# Patient Record
Sex: MALE | Race: WHITE | Employment: FULL TIME | URBAN - METROPOLITAN AREA
[De-identification: names, ages, dates, MRNs, and addresses within clinical notes are randomized per-mention and may not be internally consistent; named-entity substitution may affect disease eponyms.]

---

## 2018-03-10 ENCOUNTER — HOSPITAL ENCOUNTER (EMERGENCY)
Age: 63
Discharge: HOME | End: 2018-03-10
Payer: COMMERCIAL

## 2018-03-10 VITALS
BODY MASS INDEX: 26.63 KG/M2 | HEART RATE: 71 BPM | DIASTOLIC BLOOD PRESSURE: 73 MMHG | WEIGHT: 165 LBS | SYSTOLIC BLOOD PRESSURE: 146 MMHG | TEMPERATURE: 97.9 F | OXYGEN SATURATION: 97 % | RESPIRATION RATE: 20 BRPM

## 2018-03-10 DIAGNOSIS — J01.10 ACUTE FRONTAL SINUSITIS, RECURRENCE NOT SPECIFIED: Primary | ICD-10-CM

## 2018-03-10 PROCEDURE — 99212 OFFICE O/P EST SF 10 MIN: CPT

## 2018-03-10 RX ORDER — AMOXICILLIN AND CLAVULANATE POTASSIUM 875; 125 MG/1; MG/1
1 TABLET, FILM COATED ORAL 2 TIMES DAILY
Qty: 20 TABLET | Refills: 0 | Status: SHIPPED | OUTPATIENT
Start: 2018-03-10 | End: 2018-03-20

## 2018-03-10 RX ORDER — BROMPHENIRAMINE MALEATE, PSEUDOEPHEDRINE HYDROCHLORIDE, AND DEXTROMETHORPHAN HYDROBROMIDE 2; 30; 10 MG/5ML; MG/5ML; MG/5ML
5 SYRUP ORAL 4 TIMES DAILY PRN
Qty: 140 ML | Refills: 0 | Status: SHIPPED | OUTPATIENT
Start: 2018-03-10 | End: 2018-03-17

## 2018-03-10 NOTE — ED PROVIDER NOTES
present. No uvular deviation or edema. No tonsillary asymmetry.  Floor of the mouth soft, no trismus, handling secretions. TMs bilaterally demonstrate no evidence of infection. He has tenderness over the maxillary sinuses. Neck: Normal range of motion is achieved in the neck. There is no JVD present. No meningeal signs are present   Anterior cervical adenopathy is negative. Respiratory/chest: The chest is nontender. Breath sounds are normal. There is no respiratory distress.   Cardiovascular: Heart shows a regular rate and rhythm without murmurs clicks or gallops. Abdominal exam: The abdomen is non tender without evidence of peritoneal signs. SSkin: warm and dry, without rash  Neurologic: GCS 15   Psych: Normal Affect  -------------------------------------------------- RESULTS -------------------------------------------------    LABS:  No results found for this visit on 03/10/18. RADIOLOGY:  Interpreted by Radiologist.  No orders to display           ------------------------------ ED COURSE/MEDICAL DECISION MAKING----------------------  Medications - No data to display    ED COURSE:  ED Course          Medical Decision Making:         Upper respiratory infection with sinusitis. Not hypoxic, nothing to suggests pneumonia. Well appearing, non toxic, appropriate for outpatient management. Plan is for symptom management and PCP follow up. He was started on Augmentin and also Bromfed cough syrup he was advised to follow-up with his doctor if he does not improve         This patient's ED course included: a personal history and physicial eaxmination and re-evaluation prior to disposition    This patient has remained hemodynamically stable during their ED course. Counseling: The emergency provider has spoken with the patient and discussed todays results, in addition to providing specific details for the plan of care and counseling regarding the diagnosis and prognosis.   Questions are answered at this time

## 2018-08-29 ENCOUNTER — HOSPITAL ENCOUNTER (EMERGENCY)
Age: 63
Discharge: HOME | End: 2018-08-29
Payer: COMMERCIAL

## 2018-08-29 ENCOUNTER — APPOINTMENT (OUTPATIENT)
Dept: GENERAL RADIOLOGY | Age: 63
End: 2018-08-29
Payer: COMMERCIAL

## 2018-08-29 VITALS
OXYGEN SATURATION: 96 % | BODY MASS INDEX: 26.95 KG/M2 | TEMPERATURE: 98.5 F | RESPIRATION RATE: 16 BRPM | SYSTOLIC BLOOD PRESSURE: 134 MMHG | WEIGHT: 167 LBS | DIASTOLIC BLOOD PRESSURE: 49 MMHG | HEART RATE: 70 BPM

## 2018-08-29 DIAGNOSIS — S33.5XXA LUMBAR SPRAIN, INITIAL ENCOUNTER: Primary | ICD-10-CM

## 2018-08-29 PROCEDURE — 6360000002 HC RX W HCPCS: Performed by: PHYSICIAN ASSISTANT

## 2018-08-29 PROCEDURE — 99212 OFFICE O/P EST SF 10 MIN: CPT

## 2018-08-29 PROCEDURE — 72100 X-RAY EXAM L-S SPINE 2/3 VWS: CPT

## 2018-08-29 RX ORDER — KETOROLAC TROMETHAMINE 30 MG/ML
30 INJECTION, SOLUTION INTRAMUSCULAR; INTRAVENOUS ONCE
Status: COMPLETED | OUTPATIENT
Start: 2018-08-29 | End: 2018-08-29

## 2018-08-29 RX ADMIN — KETOROLAC TROMETHAMINE 30 MG: 30 INJECTION, SOLUTION INTRAMUSCULAR at 16:12

## 2018-08-29 ASSESSMENT — PAIN DESCRIPTION - PAIN TYPE: TYPE: ACUTE PAIN

## 2018-08-29 ASSESSMENT — PAIN SCALES - GENERAL
PAINLEVEL_OUTOF10: 3
PAINLEVEL_OUTOF10: 9

## 2018-08-29 ASSESSMENT — PAIN DESCRIPTION - LOCATION: LOCATION: BACK

## 2018-08-29 ASSESSMENT — PAIN DESCRIPTION - DESCRIPTORS: DESCRIPTORS: ACHING

## 2018-08-29 NOTE — ED PROVIDER NOTES
deficit or sensory deficit. Coordination and gait normal. GCS eye subscore is 4. GCS verbal subscore is 5. GCS motor subscore is 6. Skin: Skin is warm and dry. No abrasion and no rash noted. Nursing note and vitals reviewed. Procedures    [facility]       --------------------------------------------- PAST HISTORY ---------------------------------------------  Past Medical History:  has no past medical history on file. Past Surgical History:  has a past surgical history that includes brain surgery (1994). Social History:  reports that he has never smoked. He has never used smokeless tobacco. He reports that he does not drink alcohol or use drugs. Family History: Family history is unknown by patient. The patients home medications have been reviewed. Allergies: Patient has no known allergies. -------------------------------------------------- RESULTS -------------------------------------------------  No results found for this visit on 08/29/18. XR LUMBAR SPINE (2-3 VIEWS)   Final Result   1. No acute lumbar spine fracture. 2. Mild lower lumbar facet arthrosis.          ------------------------- NURSING NOTES AND VITALS REVIEWED ---------------------------   The nursing notes within the ED encounter and vital signs as below have been reviewed. BP (!) 134/49   Pulse 70   Temp 98.5 °F (36.9 °C) (Oral)   Resp 16   Wt 167 lb (75.8 kg)   SpO2 96%   BMI 26.95 kg/m²   Oxygen Saturation Interpretation: Normal      ------------------------------------------ PROGRESS NOTES ------------------------------------------   I have spoken with the patient and discussed todays results, in addition to providing specific details for the plan of care and counseling regarding the diagnosis and prognosis.   Their questions are answered at this time and they are agreeable with the plan.      --------------------------------- ADDITIONAL PROVIDER NOTES ---------------------------------      This patient is stable

## 2019-02-06 ENCOUNTER — APPOINTMENT (OUTPATIENT)
Dept: GENERAL RADIOLOGY | Age: 64
End: 2019-02-06
Payer: COMMERCIAL

## 2019-02-06 ENCOUNTER — HOSPITAL ENCOUNTER (EMERGENCY)
Age: 64
Discharge: HOME | End: 2019-02-06
Payer: COMMERCIAL

## 2019-02-06 VITALS
OXYGEN SATURATION: 97 % | SYSTOLIC BLOOD PRESSURE: 134 MMHG | DIASTOLIC BLOOD PRESSURE: 79 MMHG | HEART RATE: 73 BPM | WEIGHT: 167 LBS | BODY MASS INDEX: 26.95 KG/M2 | TEMPERATURE: 98 F | RESPIRATION RATE: 16 BRPM

## 2019-02-06 DIAGNOSIS — J06.9 ACUTE UPPER RESPIRATORY INFECTION: Primary | ICD-10-CM

## 2019-02-06 PROCEDURE — 99212 OFFICE O/P EST SF 10 MIN: CPT

## 2019-02-06 PROCEDURE — 71046 X-RAY EXAM CHEST 2 VIEWS: CPT

## 2019-02-06 RX ORDER — BROMPHENIRAMINE MALEATE, PSEUDOEPHEDRINE HYDROCHLORIDE, AND DEXTROMETHORPHAN HYDROBROMIDE 2; 30; 10 MG/5ML; MG/5ML; MG/5ML
10 SYRUP ORAL 4 TIMES DAILY PRN
Qty: 140 ML | Refills: 0 | Status: SHIPPED | OUTPATIENT
Start: 2019-02-06 | End: 2019-02-13

## 2019-02-06 RX ORDER — AZITHROMYCIN 250 MG/1
TABLET, FILM COATED ORAL
Qty: 6 TABLET | Refills: 0 | Status: SHIPPED | OUTPATIENT
Start: 2019-02-06 | End: 2019-02-16

## 2019-04-30 ENCOUNTER — HOSPITAL ENCOUNTER (EMERGENCY)
Age: 64
Discharge: HOME | End: 2019-04-30
Payer: COMMERCIAL

## 2019-04-30 VITALS
HEIGHT: 67 IN | HEART RATE: 72 BPM | BODY MASS INDEX: 26.21 KG/M2 | RESPIRATION RATE: 20 BRPM | OXYGEN SATURATION: 97 % | WEIGHT: 167 LBS | SYSTOLIC BLOOD PRESSURE: 157 MMHG | DIASTOLIC BLOOD PRESSURE: 52 MMHG | TEMPERATURE: 97.9 F

## 2019-04-30 DIAGNOSIS — J40 BRONCHITIS: Primary | ICD-10-CM

## 2019-04-30 DIAGNOSIS — J06.9 UPPER RESPIRATORY TRACT INFECTION, UNSPECIFIED TYPE: ICD-10-CM

## 2019-04-30 PROCEDURE — 99212 OFFICE O/P EST SF 10 MIN: CPT

## 2019-04-30 RX ORDER — AZITHROMYCIN 250 MG/1
TABLET, FILM COATED ORAL
Qty: 6 TABLET | Refills: 0 | Status: SHIPPED | OUTPATIENT
Start: 2019-04-30 | End: 2019-05-10

## 2019-04-30 RX ORDER — BROMPHENIRAMINE MALEATE, PSEUDOEPHEDRINE HYDROCHLORIDE, AND DEXTROMETHORPHAN HYDROBROMIDE 2; 30; 10 MG/5ML; MG/5ML; MG/5ML
10 SYRUP ORAL 4 TIMES DAILY PRN
Qty: 140 ML | Refills: 0 | Status: SHIPPED | OUTPATIENT
Start: 2019-04-30 | End: 2019-05-07

## 2019-04-30 NOTE — ED PROVIDER NOTES
Department of Emergency . [facility] [address] [facility]  Provider Note  Admit Date/RoomTime: 4/30/2019  4:14 PM  Room: 02/02    Chief Complaint   Nasal Congestion; Cough (Worse at night.); and Chest Congestion    History of Present Illness   Source of history provided by:  patient. History/Exam Limitations: none. Tomás Kline is a 70 y.o. old male who has a past medical history of: History reviewed. No pertinent past medical history. presents to the urgent care center  by private vehicle, with complaints of cough, chest congestion nasal congestion postnasal drip and the cough seems to be worse at night. He does not have a fever does not have body aches does not have chest pain or shortness of breath. He said he is been sick for 2 weeks and it's been persisting. ROS   Pertinent positives and negatives are stated within HPI, all other systems reviewed and are negative. Past Surgical History:   Procedure Laterality Date    BRAIN SURGERY  1994    [facility]   Social History:  reports that he has never smoked. He has never used smokeless tobacco. He reports that he does not drink alcohol or use drugs. Family History: Family history is unknown by patient. Allergies: Patient has no known allergies. Physical Exam           ED Triage Vitals [04/30/19 1615]   BP Temp Temp Source Pulse Resp SpO2 Height Weight   (!) 157/52 97.9 °F (36.6 °C) Oral 72 20 97 % 5' 7\" (1.702 m) 167 lb (75.8 kg)      Oxygen Saturation Interpretation: Normal.    Constitutional:  Alert, development consistent with age. HEENT:  NC/NT. Airway patent. Bilateral TMs normal with no signs of infection, pharynx without exudates no tonsillar enlargement. Neck:  Normal ROM. Supple. Respiratory:  Breath sounds: equal bilaterally. Lung sounds: normal.   CV:  Regular rate and rhythm, normal heart sounds, without pathological murmurs, ectopy, gallops, or rubs. .  GI:  Abdomen Soft, nontender, good bowel sounds.   No firm or pulsatile mass. Integument:  Normal turgor. Warm, dry, without visible rash. Lymphatic:  Edema:  none Bilateral lower extremity(s). Neurological:  Oriented. Motor functions intact. Lab / Imaging Results   (All laboratory and radiology results have been personally reviewed by myself)  Labs:  No results found for this visit on 04/30/19. Imaging: All Radiology results interpreted by Radiologist unless otherwise noted. No orders to display       ED Course / Medical Decision Making   Medications - No data to display     Consult(s):   None  MDM:      Patient  with an upper respiratory infection. Chest congestion his lungs are clear his sats 97% he does not have a fever. I did put him on a Z-Elen and Bromfed advised if he worsens or does not improve he should get reevaluated    Patient is well appearing, non toxic and appropriate for outpatient management. Plan of Care: Normal progression of disease discussed. All questions answered. Explained the rationale for symptomatic treatment  Instruction provided in the use of fluids, vaporizer, acetaminophen, and other OTC medication for symptom control. Extra fluids  Analgesics as needed, dose reviewed. Follow up as needed should symptoms fail to improve. Counseling:   I have spoken with the patient and discussed todays results, in addition to providing specific details for the plan of care and counseling regarding the diagnosis and prognosis. Questions are answered at this time and they are agreeable with the plan. Assessment      1. Bronchitis    2. Upper respiratory tract infection, unspecified type      Plan   Discharge to home and advised to contact follow up with your Dr      As needed   Patient condition is good    New Medications     New Prescriptions    AZITHROMYCIN (ZITHROMAX Z-ELEN) 250 MG TABLET    Take 2 tabs on day one, followed by 1 tablet daily for 4 days.     BROMPHENIRAMINE-PSEUDOEPHEDRINE-DM 2-30-10 MG/5ML SYRUP    Take 10 mLs by mouth 4 times daily as needed for Congestion or Cough     Electronically signed by OXANA Tripp CNP   DD: 4/30/19  **This report was transcribed using voice recognition software. Every effort was made to ensure accuracy; however, inadvertent computerized transcription errors may be present.   END OF ED PROVIDER NOTE     OXANA Tripp CNP  04/30/19 7922

## 2020-03-26 ENCOUNTER — HOSPITAL ENCOUNTER (EMERGENCY)
Age: 65
Discharge: HOME | End: 2020-03-26
Payer: COMMERCIAL

## 2020-03-26 VITALS
RESPIRATION RATE: 16 BRPM | OXYGEN SATURATION: 97 % | DIASTOLIC BLOOD PRESSURE: 74 MMHG | HEART RATE: 87 BPM | TEMPERATURE: 98.2 F | BODY MASS INDEX: 26.16 KG/M2 | SYSTOLIC BLOOD PRESSURE: 179 MMHG | WEIGHT: 167 LBS

## 2020-03-26 PROCEDURE — 99212 OFFICE O/P EST SF 10 MIN: CPT

## 2020-03-26 RX ORDER — AZITHROMYCIN 250 MG/1
TABLET, FILM COATED ORAL
Qty: 1 PACKET | Refills: 0 | Status: SHIPPED | OUTPATIENT
Start: 2020-03-26 | End: 2020-04-05

## 2020-03-26 RX ORDER — AZITHROMYCIN 250 MG/1
TABLET, FILM COATED ORAL
Qty: 1 PACKET | Refills: 0 | Status: SHIPPED | OUTPATIENT
Start: 2020-03-26 | End: 2020-03-26 | Stop reason: SDUPTHER

## 2020-03-26 NOTE — ED PROVIDER NOTES
Abdomen is not rigid. Tenderness: There is no abdominal tenderness. There is no guarding or rebound. Skin:     General: Skin is warm and dry. Findings: No abrasion or rash. Neurological:      Mental Status: He is alert and oriented to person, place, and time. GCS: GCS eye subscore is 4. GCS verbal subscore is 5. GCS motor subscore is 6. Cranial Nerves: No cranial nerve deficit. Sensory: No sensory deficit. Coordination: Coordination normal.      Gait: Gait normal.         Procedures    MDM  Number of Diagnoses or Management Options  Acute bronchitis, unspecified organism:   Diagnosis management comments: He will keep using his vaporizer at home. He does have some cough and cold medication he can take. I will add Zithromax to this.           --------------------------------------------- PAST HISTORY ---------------------------------------------  Past Medical History:  has no past medical history on file. Past Surgical History:  has a past surgical history that includes brain surgery (1994). Social History:  reports that he has never smoked. He has never used smokeless tobacco. He reports that he does not drink alcohol or use drugs. Family History: Family history is unknown by patient. The patients home medications have been reviewed. Allergies: Patient has no known allergies. -------------------------------------------------- RESULTS -------------------------------------------------  No results found for this visit on 03/26/20. No orders to display       ------------------------- NURSING NOTES AND VITALS REVIEWED ---------------------------   The nursing notes within the ED encounter and vital signs as below have been reviewed.    BP (!) 179/74   Pulse 87   Temp 98.2 °F (36.8 °C) (Oral)   Resp 16   Wt 167 lb (75.8 kg)   SpO2 97%   BMI 26.16 kg/m²   Oxygen Saturation Interpretation: Normal      ------------------------------------------ PROGRESS NOTES

## 2020-04-13 ENCOUNTER — HOSPITAL ENCOUNTER (EMERGENCY)
Age: 65
Discharge: HOME | End: 2020-04-13
Attending: NURSE PRACTITIONER
Payer: COMMERCIAL

## 2020-04-13 VITALS
HEART RATE: 78 BPM | TEMPERATURE: 97.9 F | DIASTOLIC BLOOD PRESSURE: 75 MMHG | BODY MASS INDEX: 25.84 KG/M2 | OXYGEN SATURATION: 98 % | WEIGHT: 165 LBS | RESPIRATION RATE: 18 BRPM | SYSTOLIC BLOOD PRESSURE: 144 MMHG

## 2020-04-13 PROCEDURE — 99212 OFFICE O/P EST SF 10 MIN: CPT

## 2020-04-13 RX ORDER — FLUTICASONE PROPIONATE 50 MCG
1 SPRAY, SUSPENSION (ML) NASAL DAILY
Qty: 1 BOTTLE | Refills: 0 | Status: SHIPPED | OUTPATIENT
Start: 2020-04-13 | End: 2020-12-16 | Stop reason: ALTCHOICE

## 2020-04-13 NOTE — ED PROVIDER NOTES
Regular rate and rhythm, normal heart sounds, without pathological murmurs, ectopy, gallops, or rubs. · Integument:  Normal turgor. Warm, dry, without visible rash. · Neurological:  Oriented. Motor functions intact. Lab / Imaging Results   (All laboratory and radiology results have been personally reviewed by myself)  Labs:  No results found for this visit on 04/13/20. Imaging: All Radiology results interpreted by Radiologist unless otherwise noted. No orders to display       ED Course / Medical Decision Making   Medications - No data to display       Consults:   None    Procedures:   none    Medical Decision Making:    Based on low suspicion for pneumonia as per history/physical findings, imaging was not done. Antibiotics are not indicated at this time based on clinical presentation and physical findings. He is not hypoxic. Patient is well appearing, non toxic and appropriate for outpatient management. Plan of Care: Normal progression of disease discussed. All questions answered. Explained the rationale for symptomatic treatment rather than use of an antibiotic. Extra fluids  Analgesics as needed, dose reviewed. Follow up as needed should symptoms fail to improve. Counseling: The emergency provider has spoken with the patient and discussed todays results, in addition to providing specific details for the plan of care and counseling regarding the diagnosis and prognosis. Questions are answered at this time and they are agreeable with the plan. Assessment     1. Allergic rhinitis, unspecified seasonality, unspecified trigger      Plan   Discharge to home  Patient condition is stable    New Medications     New Prescriptions    FLUTICASONE (FLONASE) 50 MCG/ACT NASAL SPRAY    1 spray by Each Nostril route daily     Electronically signed by OXANA Swanson CNP   DD: 4/13/20  **This report was transcribed using voice recognition software.  Every effort was made to ensure accuracy;

## 2020-04-14 ENCOUNTER — CARE COORDINATION (OUTPATIENT)
Dept: CARE COORDINATION | Age: 65
End: 2020-04-14

## 2020-04-14 NOTE — CARE COORDINATION
sick, wash your hands before and after you interact with pets and wear a facemask. Call ahead before visiting your doctor  If you have a medical appointment, call the healthcare provider and tell them that you have or may have COVID-19. This will help the healthcare provider's office take steps to keep other people from getting infected or exposed. Wear a facemask  You should wear a facemask when you are around other people (e.g., sharing a room or vehicle) or pets and before you enter a healthcare provider's office. If you are not able to wear a facemask (for example, because it causes trouble breathing), then people who live with you should not stay in the same room with you, or they should wear a facemask if they enter your room. Cover your coughs and sneezes  Cover your mouth and nose with a tissue when you cough or sneeze. Throw used tissues in a lined trash can. Immediately wash your hands with soap and water for at least 20 seconds or, if soap and water are not available, clean your hands with an alcohol-based hand  that contains at least 60% alcohol. Clean your hands often  Wash your hands often with soap and water for at least 20 seconds, especially after blowing your nose, coughing, or sneezing; going to the bathroom; and before eating or preparing food. If soap and water are not readily available, use an alcohol-based hand  with at least 60% alcohol, covering all surfaces of your hands and rubbing them together until they feel dry. Soap and water are the best option if hands are visibly dirty. Avoid touching your eyes, nose, and mouth with unwashed hands. Avoid sharing personal household items  You should not share dishes, drinking glasses, cups, eating utensils, towels, or bedding with other people or pets in your home. After using these items, they should be washed thoroughly with soap and water.   Clean all high-touch surfaces everyday  High touch surfaces include counters, tabletops, doorknobs, bathroom fixtures, toilets, phones, keyboards, tablets, and bedside tables. Also, clean any surfaces that may have blood, stool, or body fluids on them. Use a household cleaning spray or wipe, according to the label instructions. Labels contain instructions for safe and effective use of the cleaning product including precautions you should take when applying the product, such as wearing gloves and making sure you have good ventilation during use of the product. Monitor your symptoms  Seek prompt medical attention if your illness is worsening (e.g., difficulty breathing). Before seeking care, call your healthcare provider and tell them that you have, or are being evaluated for, COVID-19. Put on a facemask before you enter the facility. These steps will help the healthcare provider's office to keep other people in the office or waiting room from getting infected or exposed. Ask your healthcare provider to call the local or [facility] health department. Persons who are placed under If you have a medical emergency and need to call 911, notify the dispatch personnel that you have, or are being evaluated for COVID-19. If possible, put on a facemask before emergency medical services arrive. The patient agrees to contact the [facility] exposure line [phone], local health department [place] [facility]: ([phone]) and PCP office for questions related to their healthcare. Author provided contact information for future reference. Patient/family/caregiver given information for [employer] and agrees to enroll no    Based on Loop alert triggers, patient will be contacted by nurse care manager for worsening symptoms.

## 2020-04-28 ENCOUNTER — CARE COORDINATION (OUTPATIENT)
Dept: CARE COORDINATION | Age: 65
End: 2020-04-28

## 2020-09-27 ENCOUNTER — HOSPITAL ENCOUNTER (EMERGENCY)
Age: 65
Discharge: HOME | End: 2020-09-27
Payer: COMMERCIAL

## 2020-09-27 VITALS
RESPIRATION RATE: 16 BRPM | WEIGHT: 165 LBS | SYSTOLIC BLOOD PRESSURE: 146 MMHG | TEMPERATURE: 98.8 F | HEART RATE: 67 BPM | OXYGEN SATURATION: 98 % | DIASTOLIC BLOOD PRESSURE: 72 MMHG | BODY MASS INDEX: 25.84 KG/M2

## 2020-09-27 PROCEDURE — 99212 OFFICE O/P EST SF 10 MIN: CPT

## 2020-09-27 NOTE — ED PROVIDER NOTES
Department of Emergency Medicine   ED  Provider Note  Admit Date/RoomTime: 9/27/2020  3:28 PM  ED Room: 02/02   Chief Complaint:   Otalgia (bilateral ears started Wednesday)    History of Present Illness   Source of history provided by:  patient. History/Exam Limitations: none. Grayland Cogan is a 77 y.o. old male with a past medical history of: History reviewed. No pertinent past medical history. ,presenting to the emergency department with complaint of ear discomfort and itching. Patient states he has had skin cancer in the past so now he spray sunblock on him all the time. He is concerned that he sprayed sunblock into his ears. He states they have been itchy. He states he is also getting flakes from the ears and is concerned he has a lot of wax. Patient denies fevers and chills. Does admit to dizziness if he stands up suddenly from bending over. Denies any dizziness or lightheadedness just sitting or just lying. Denies sore throat or difficulty swallowing. Denies any nausea or vomiting. ROS    Pertinent positives and negatives are stated within HPI, all other systems reviewed and are negative. Past Surgical History:  has a past surgical history that includes brain surgery (1994). Social History:  reports that he has never smoked. He has never used smokeless tobacco. He reports that he does not drink alcohol or use drugs. Family History: Family history is unknown by patient. Allergies: Patient has no known allergies. Physical Exam           ED Triage Vitals   BP Temp Temp src Pulse Resp SpO2 Height Weight   09/27/20 1532 09/27/20 1530 -- 09/27/20 1530 09/27/20 1530 09/27/20 1530 -- 09/27/20 1530   (!) 146/72 98.8 °F (37.1 °C)  67 16 98 %  165 lb (74.8 kg)      Oxygen Saturation Interpretation: Normal.    General:  NAD. Alert and Oriented. Well-appearing. Skin:  Warm, dry. No rashes. Head:  Normocephalic. Atraumatic. Eyes:  EOMI.   Conjunctiva normal.  ENT:  Oral mucosa moist. Airway patent. Posterior pharynx without erythema, without swelling, without exudate. Uvula is long, however not edematous or erythematous. Bilateral TMs without erythema, without bulging. Bilateral external ear canals without erythema, without edema, without discharge. He has minimal wax to both ears. No lesions noted to the canals. Ears are nontender to palpation or pulling. Negative mastoid tenderness bilateral.  Neck:  Supple. Normal ROM. Respiratory:  No respiratory distress. No labored breathing. Lungs clear without rales, rhonchi or wheezing. Cardiovascular:  Regular rate. No Murmur. No peripheral edema. Extremities warm and good color. Extremities:  Normal ROM. Nontender to palpation. Atraumatic. Back:  Normal ROM. Nontender to palpation. Neuro:  Alert and Oriented to person, place, time and situation. Normal LOC. Moves all extremities. Speech fluent. Psych:  Calm and Cooperative. Normal thought process. Normal judgement. Lab / Imaging Results   (All laboratory and radiology results have been personally reviewed by myself)  Labs:  No results found for this visit on 09/27/20. Imaging: All Radiology results interpreted by Radiologist unless otherwise noted. No orders to display     ED Course / Medical Decision Making   Medications - No data to display     Re-examination:  9/27/20       Time:        Consult(s):   None    Procedure(s):   none    MDM:       Counseling: The emergency provider has spoken with the patient and discussed todays results, in addition to providing specific details for the plan of care and counseling regarding the diagnosis and prognosis. Questions are answered at this time and they are agreeable with the plan. Assessment      1.  Otalgia of both ears      Plan   Discharge to home  Patient condition is good    New Medications     New Prescriptions    No medications on file     Electronically signed by CHITO Rivas   DD: 9/27/20  **This

## 2020-12-26 PROBLEM — J34.2 ACQUIRED DEVIATED NASAL SEPTUM: Status: ACTIVE | Noted: 2020-12-26

## 2021-12-22 ENCOUNTER — HOSPITAL ENCOUNTER (EMERGENCY)
Age: 66
Discharge: HOME | End: 2021-12-22
Payer: COMMERCIAL

## 2021-12-22 VITALS
TEMPERATURE: 97.7 F | DIASTOLIC BLOOD PRESSURE: 85 MMHG | OXYGEN SATURATION: 95 % | WEIGHT: 165 LBS | SYSTOLIC BLOOD PRESSURE: 143 MMHG | BODY MASS INDEX: 25.84 KG/M2 | HEART RATE: 100 BPM | RESPIRATION RATE: 18 BRPM

## 2021-12-22 DIAGNOSIS — J01.90 ACUTE SINUSITIS, RECURRENCE NOT SPECIFIED, UNSPECIFIED LOCATION: Primary | ICD-10-CM

## 2021-12-22 PROCEDURE — 99211 OFF/OP EST MAY X REQ PHY/QHP: CPT

## 2021-12-22 RX ORDER — AZITHROMYCIN 250 MG/1
TABLET, FILM COATED ORAL
Qty: 6 TABLET | Refills: 0 | Status: SHIPPED | OUTPATIENT
Start: 2021-12-22 | End: 2022-01-01

## 2021-12-22 NOTE — ED PROVIDER NOTES
Department of Emergency . [facility] [address] Urgent [facility]  Provider Note  Admit Date/RoomTime: 2021  9:35 AM  Room:   NAME: Gali Mak  : 1955  MRN: [id number]     Chief Complaint:  Sinusitis (drainage, pressure, congestion, started yesterday)    History of Present Illness       Gali Mak is a 74 y.o. old male who presents to the emergency department sinus congestion and drainage. He said it started yesterday. He said he does not believe it is Covid has not been exposed does not have a fever body aches sore throat or shortness of breath. He said he has a lot of pressure over his forehead he said he gets this usually once a year he has a history of a deviated septum. Eyes chest pain shortness of breath body aches loss of taste or smell or any other complaints    ROS    Pertinent positives and negatives are stated within HPI, all other systems reviewed and are negative. Past Surgical History:   Procedure Laterality Date    BRAIN SURGERY      [facility]   Social History:  reports that he has never smoked. He has never used smokeless tobacco. He reports that he does not drink alcohol and does not use drugs. Family History: Family history is unknown by patient. Allergies: Patient has no known allergies. Physical Exam            ED Triage Vitals [21 0940]   BP Temp Temp src Pulse Resp SpO2 Height Weight   (!) 143/85 100.2 °F (37.9 °C) -- 100 18 95 % -- 165 lb (74.8 kg)      Oxygen Saturation Interpretation: Normal.    Constitutional:  Alert, development consistent with age. Ears:  External Ears: Bilateral normal.               TM's & External Canals: normal TMs bilaterally. Nose:   There is no discharge, swelling or lesions noted. Sinuses: no Bilateral maxillary sinus tenderness. mild Bilateral frontal sinus tenderness. Mouth:  normal tongue and buccal mucosa. Throat: no erythema or exudates noted.  Teeth and gums normal..  Airway Patent. Neck/Lymphatics:  Neck Supple. Respiratory:     Lung sounds: normal.   CV:  Regular rate and rhythm  GI:  Abdomen Soft, nontender, good bowel sounds. No firm or pulsatile mass. Integument:  .  Warm, dry, without visible rash. Neurological:  Oriented. Motor functions intact. Lab / Imaging Results   (All laboratory and radiology results have been personally reviewed by myself)  Labs:  No results found for this visit on 12/22/21. Imaging: All Radiology results interpreted by Radiologist unless otherwise noted. No orders to display     ED Course / Medical Decision Making   Medications - No data to display       MDM:   Patient is requesting a Z-Elen he said that works for his sinus symptoms he has no other complaints does not want tested for Covid does not just has a sinus symptoms no other symptoms. 1. Acute sinusitis, recurrence not specified, unspecified location      Plan   Discharge to home and advised to contact follow up with your Dr      As needed   Patient condition is good    New Medications     New Prescriptions    AZITHROMYCIN (ZITHROMAX Z-ELEN) 250 MG TABLET    Take 2 tabs on day one, followed by 1 tablet daily for 4 days. Electronically signed by OXANA Harris CNP   DD: 12/22/21  **This report was transcribed using voice recognition software. Every effort was made to ensure accuracy; however, inadvertent computerized transcription errors may be present.   END OF ED PROVIDER NOTE     OXANA Harris CNP  12/22/21 1010

## 2021-12-29 ENCOUNTER — APPOINTMENT (OUTPATIENT)
Dept: GENERAL RADIOLOGY | Age: 66
End: 2021-12-29
Payer: COMMERCIAL

## 2021-12-29 ENCOUNTER — HOSPITAL ENCOUNTER (EMERGENCY)
Age: 66
Discharge: HOME | End: 2021-12-29
Payer: COMMERCIAL

## 2021-12-29 VITALS
TEMPERATURE: 100.7 F | OXYGEN SATURATION: 94 % | SYSTOLIC BLOOD PRESSURE: 130 MMHG | BODY MASS INDEX: 26.16 KG/M2 | DIASTOLIC BLOOD PRESSURE: 68 MMHG | WEIGHT: 167 LBS | HEART RATE: 94 BPM | RESPIRATION RATE: 20 BRPM

## 2021-12-29 DIAGNOSIS — U07.1 COVID-19: Primary | ICD-10-CM

## 2021-12-29 LAB — SARS-COV-2, NAAT: DETECTED

## 2021-12-29 PROCEDURE — 87635 SARS-COV-2 COVID-19 AMP PRB: CPT

## 2021-12-29 PROCEDURE — 99211 OFF/OP EST MAY X REQ PHY/QHP: CPT

## 2021-12-29 PROCEDURE — 71046 X-RAY EXAM CHEST 2 VIEWS: CPT

## 2021-12-29 NOTE — ED PROVIDER NOTES
Department of Emergency [address] [facility]  Provider Note  Admit Date/Time: 2021 11:00 AM  Room:   NAME: Ryan Montana  : 1955  MRN: [id number]     Chief Complaint:  Cough (nause, body aches, sick for 2 weeks, had a ZPAC  from us a week and a half ago, still sick)    History of Present Illness        Ryan Montana is a 74 y.o. male who has a past medical history of:   Past Medical History:   Diagnosis Date    Seizures ([address]     had surgery for them, last seizure     presents to the urgent care center by private car for evaluation. He has a cough, body aches, fever, chest congestion, nasal congestion. Last week he had a Z-Mark for his sinuses and now he is worse and in fact no better. He is not vaccinated for Covid. ROS    Pertinent positives and negatives are stated within HPI, all other systems reviewed and are negative. Past Surgical History:   Procedure Laterality Date    BRAIN SURGERY      AVM   Social History:  reports that he has never smoked. He has never used smokeless tobacco. He reports that he does not drink alcohol and does not use drugs. Family History: Family history is unknown by patient. Allergies: Patient has no known allergies. Physical Exam   Oxygen Saturation Interpretation: Normal.   ED Triage Vitals   BP Temp Temp src Pulse Resp SpO2 Height Weight   21 1109 21 1102 -- 21 1102 21 1102 21 1102 -- 21 1102   130/68 100.7 °F (38.2 °C)  94 20 94 %  167 lb (75.8 kg)       Physical Exam  · Constitutional/General: Alert and oriented x3, ill appearing, non toxic in NAD  · HEENT:  NC/NT.clear conjunctiva. · Neck: Supple, full ROM,   · Respiratory: Lungs clear to auscultation bilaterally,  · CV:  Regular rate. Regular rhythm. · Musculoskeletal: Moves all extremities x 4.   · Integument: skin warm and dry. No rashes.    · Lymphatic: no lymphadenopathy noted  · Neurologic: GCS 15, no focal deficits,   · Psychiatric: Normal Affect    Lab / Imaging Results   (All laboratory and radiology results have been personally reviewed by myself)  Labs:  Results for orders placed or performed during the hospital encounter of 12/29/21   COVID-19, Rapid    Specimen: Nasopharyngeal Swab   Result Value Ref Range    SARS-CoV-2, NAAT DETECTED (A) Not Detected     Imaging: All Radiology results interpreted by Radiologist unless otherwise noted. XR CHEST (2 VW)   Final Result   No pneumonia or pleural effusion. ED Course / Medical Decision Making   Medications - No data to display       Consult(s):   None    MDM:   Patient here with a fever of 100.7 he has been coughing he is achy all over. He is not been vaccinated. He was seen here last week for his sinuses did not want Covid tested at that point. I did test him today with a rapid test and he is positive. Did a chest x-ray and it was negative. He had a Z-Mark last week for his sinuses which did not help    I advised him to obtain a pulse oximeter to monitor his oxygen level if it is over 93 I told him that was normal I told him if it is 90 or below he needs to go to the emergency department he is 94% today    I advised him to monitor his temperature keep his fever controlled with Tylenol or ibuprofen he can also take over-the-counter cough medicine as needed. He should stay in quarantine, and hip he has any worsening symptoms at all such as weakness chest pain or shortness of breath go to the emergency department        Assessment      1.  COVID-19      Plan   Discharge to home and advised to contact     Schedule an appointment as soon as possible for a visit in 3 days  Follow up within 3 days, Return to ED sooner if symptoms worsen    Call the referral line to get established with a doctor  [phone]       Patient condition is good    New Medications     New Prescriptions    No medications on file     Electronically signed by OXANA Wheeler - CNP   DD: 12/29/21  **This report was transcribed using voice recognition software. Every effort was made to ensure accuracy; however, inadvertent computerized transcription errors may be present.   END OF ED PROVIDER NOTE     OXANA Marie - CNP  12/29/21 1142

## 2021-12-30 ENCOUNTER — CARE COORDINATION (OUTPATIENT)
Dept: CARE COORDINATION | Age: 66
End: 2021-12-30

## 2021-12-30 NOTE — CARE COORDINATION
Date/Time:  12/30/2021 11:32 AM  Attempted to reach patient by telephone. Call within 2 business days of discharge: Yes Left HIPPA compliant message requesting a return call. Will attempt to reach patient again.

## 2022-12-30 ENCOUNTER — ANESTHESIA EVENT (OUTPATIENT)
Dept: OPERATING ROOM | Age: 67
End: 2022-12-30
Payer: COMMERCIAL

## 2022-12-30 NOTE — ANESTHESIA PRE PROCEDURE
Department of Anesthesiology  Preprocedure Note       Name:  Ryan Montana   Age:  69 y.o.  :  1955                                          MRN:  [id number]         Date:  2022      Surgeon: Maude Thomas):  Scott Bolaños MD    Procedure: Procedure(s):  CATARACT EXTRACTION WITH IOL    Medications prior to admission:   Prior to Admission medications    Medication Sig Start Date End Date Taking? Authorizing Provider   Multiple Vitamins-Minerals (MULTI COMPLETE PO) Take by mouth   Yes Historical Provider, MD       Current medications:    No current facility-administered medications for this encounter. Current Outpatient Medications   Medication Sig Dispense Refill    Multiple Vitamins-Minerals (MULTI COMPLETE PO) Take by mouth         Allergies:  No Known Allergies    Problem List:    Patient Active Problem List   Diagnosis Code    Near syncope R51    Acquired deviated nasal septum J34.2       Past Medical History:        Diagnosis Date    Seiz[address] ([facility] [address]     had surgery for them, last seizure        Past Surgical History:        Procedure Laterality Date    BRAIN SURGERY      AVM    COLONOSCOPY      ENDOSCOPY, COLON, DIAGNOSTIC         Social History:    Social History     Tobacco Use    Smoking status: Never    Smokeless tobacco: Never   Substance Use Topics    Alcohol use: No                                Counseling given: Not Answered      Vital Signs (Current):   Vitals:    22 1023   Weight: 167 lb (75.8 kg)   Height: 5' 7\" (1.702 m)                                              BP Readings from Last 3 Encounters:   21 130/68   21 (!) 143/85   21 138/88       NPO Status:                           >8 hours                                                      BMI:   Wt Readings from Last 3 Encounters:   21 167 lb (75.8 kg)   21 165 lb (74.8 kg)   21 170 lb 6.4 oz (77.3 kg)     Body mass index is 26.16 kg/m².     CBC:   Lab Results   Component Value Date/Time    WBC 8.1 05/21/2013 04:25 PM    RBC 5.12 05/21/2013 04:25 PM    HGB 14.9 05/21/2013 04:25 PM    HCT 44.4 05/21/2013 04:25 PM    MCV 86.7 05/21/2013 04:25 PM    RDW 13.9 05/21/2013 04:25 PM     05/21/2013 04:25 PM       CMP:   Lab Results   Component Value Date/Time     05/22/2013 02:05 AM    K 3.8 05/22/2013 02:05 AM     05/22/2013 02:05 AM    CO2 27 05/22/2013 02:05 AM    BUN 18 05/22/2013 02:05 AM    CREATININE 1.0 05/22/2013 02:05 AM    LABGLOM >60 05/22/2013 02:59 AM    GLUCOSE 118 05/22/2013 02:05 AM    GLUCOSE 108 11/26/2011 01:20 PM    PROT 6.4 05/22/2013 02:05 AM    CALCIUM 8.7 05/22/2013 02:05 AM    BILITOT 0.3 05/22/2013 02:05 AM    ALKPHOS 105 05/22/2013 02:05 AM    AST 20 05/22/2013 02:05 AM    ALT 30 05/22/2013 02:05 AM       POC Tests: No results for input(s): POCGLU, POCNA, POCK, POCCL, POCBUN, POCHEMO, POCHCT in the last 72 hours.     Coags: No results found for: PROTIME, INR, APTT    HCG (If Applicable): No results found for: PREGTESTUR, PREGSERUM, HCG, HCGQUANT     ABGs: No results found for: PHART, PO2ART, PFL8MGC, XPP4OQE, BEART, R4GWFZLK     Type & Screen (If Applicable):  No results found for: LABABO, LABRH    Drug/Infectious Status (If Applicable):  No results found for: HIV, HEPCAB    COVID-19 Screening (If Applicable):   Lab Results   Component Value Date/Time    COVID19 DETECTED 12/29/2021 11:20 AM           Anesthesia Evaluation  Patient summary reviewed and Nursing notes reviewed  Airway: Mallampati: II  TM distance: >3 FB   Neck ROM: full  Mouth opening: > = 3 FB   Dental: normal exam         Pulmonary: breath sounds clear to auscultation      (-) not a current smoker                           Cardiovascular:Negative CV ROS            Rhythm: regular             Beta Blocker:  Not on Beta Blocker         Neuro/Psych:   (+) seizures: well controlled,              ROS comment: cranio for AVM    Hx of near-syncope GI/Hepatic/Renal: Neg GI/Hepatic/Renal ROS            Endo/Other: Negative Endo/Other ROS                    Abdominal:             Vascular: negative vascular ROS. ROS comment: AVM---repaired. Other Findings:           Anesthesia Plan      MAC     ASA 2       Induction: intravenous. MIPS: Prophylactic antiemetics administered. Anesthetic plan and risks discussed with patient. Plan discussed with CRNA. Johana Fernandez MD   12/30/2022      DOS STAFF ADDENDUM:    Pt seen and examined, physical exam updated, chart reviewed including anesthesia, drug and allergy history. H&P reviewed. No interval changes to history or physical examination (unless noted above). NPO status confirmed. Anesthetic plan, risks, benefits, alternatives discussed with patient. Patient verbalized an understanding and agrees to proceed.      Leo Oropeza DO  Staff Anesthesiologist  6:59 AM

## 2023-01-02 NOTE — H&P
History and Physical    Patient's Name/Date of Birth: Cj Rodriguez / 1955 (11 y.o.)    Date: January 2, 2023     Chief Complaint: Decreased vision of the right eye    HPI: Mature right cataract with decreased vision . Risks and complications as well as options and benefits were discussed with the patient and he has elected to proceed with right cataract extraction with intra ocular lens implant. Past Medical History:   Diagnosis Date    Seizures ([address]     had surgery for them, last seizure 2008       Past Surgical History:   Procedure Laterality Date    BRAIN SURGERY  1994    AVM    COLONOSCOPY      ENDOSCOPY, COLON, DIAGNOSTIC         Prior to Admission medications    Medication Sig Start Date End Date Taking? Authorizing Provider   Multiple Vitamins-Minerals (MULTI COMPLETE PO) Take by mouth   Yes Historical Provider, MD       Patient has no known allergies.     Family History   Family history unknown: Yes       Social History     Socioeconomic History    Marital status:      Spouse name: Not on file    Number of children: Not on file    Years of education: Not on file    Highest education level: Not on file   Occupational History    Not on file   Tobacco Use    Smoking status: Never    Smokeless tobacco: Never   Vaping Use    Vaping Use: Never used   Substance and Sexual Activity    Alcohol use: No    Drug use: No    Sexual activity: Yes     Partners: Female   Other Topics Concern    Not on file   Social History Narrative    Not on file     Social Determinants of Health     Financial Resource Strain: Not on file   Food Insecurity: Not on file   Transportation Needs: Not on file   Physical Activity: Not on file   Stress: Not on file   Social Connections: Not on file   Intimate Partner Violence: Not on file   Housing Stability: Not on file       Review of Systems:   CONSTITUTIONAL:  Oriented to person, place and time  EYES:  Mature right cataract with decreased vision effecting reading, driving and all routine home activities . Visual acuity is 20/80  HEENT:  negative  RESPIRATORY:  negative  CARDIOVASCULAR:  negative  GASTROINTESTINAL:  negative  GENITOURINARY:  negative  INTEGUMENT/BREAST:  negative  HEMATOLOGIC/LYMPHATIC:  negative  ALLERGIC/IMMUNOLOGIC:  negative  ENDOCRINE:  negative  MUSCULOSKELETAL:  negative  NEUROLOGICAL:  negative  BEHAVIOR/PSYCH:  negative    Physical Exam:  Vitals:    12/28/22 1023   Weight: 167 lb (75.8 kg)   Height: 5' 7\" (1.702 m)       CONSTITUTIONAL:  awake, alert, cooperative, no apparent distress, and appears stated age  EYES:  Lids and lashes normal, pupils equal, round and reactive to light, extra ocular muscles intact, sclera clear, conjunctiva normal  ENT:  Normocephalic, without obvious abnormality, atraumatic, sinuses nontender on palpation, external ears without lesions, oral pharynx with moist mucus membranes, tonsils without erythema or exudates, gums normal and good dentition.   NECK:  Supple, symmetrical, trachea midline, no adenopathy, thyroid symmetric, not enlarged and no tenderness, skin normal  HEMATOLOGIC/LYMPHATICS:  no cervical lymphadenopathy and no supraclavicular lymphadenopathy  BACK:  Symmetric, no curvature, spinous processes are non-tender on palpation, paraspinous muscles are non-tender on palpation, no costal vertebral tenderness  LUNGS:  No increased work of breathing, good air exchange, clear to auscultation bilaterally, no crackles or wheezing  CARDIOVASCULAR:  Normal apical impulse, regular rate and rhythm, normal S1 and S2, no S3 or S4, and no murmur noted  ABDOMEN:  No scars, normal bowel sounds, soft, non-distended, non-tender, no masses palpated, no hepatosplenomegally  CHEST/BREASTS:  Breasts symmetrical, skin without lesion(s), no nipple retraction or dimpling, no nipple discharge, no masses palpated, no axillary or supraclavicular adenopathy  GENITAL/URINARY:  Deferred  MUSCULOSKELETAL:  There is no redness, warmth, or swelling of the joints. Full range of motion noted. Motor strength is 5 out of 5 all extremities bilaterally. Tone is normal.  NEUROLOGIC:  Awake, alert, oriented to name, place and time. Cranial nerves II-XII are grossly intact. Motor is 5 out of 5 bilaterally. Cerebellar finger to nose, heel to shin intact. Sensory is intact. Babinski down going, Romberg negative, and gait is normal.  SKIN:  no bruising or bleeding, normal skin color, texture, turgor, and no redness, warmth, or swelling    Assessment:  Active Problems:    * No active hospital problems. *  Resolved Problems:    * No resolved hospital problems.  *      Plan:  Right cataract extraction with intra ocular lens implant    Electronically signed by Mónica Alas MD on 1/2/23 at 11:13 AM EST

## 2023-01-03 ENCOUNTER — ANESTHESIA (OUTPATIENT)
Dept: OPERATING ROOM | Age: 68
End: 2023-01-03
Payer: COMMERCIAL

## 2023-01-03 ENCOUNTER — HOSPITAL ENCOUNTER (OUTPATIENT)
Age: 68
Setting detail: OUTPATIENT SURGERY
Discharge: HOME | End: 2023-01-03
Attending: OPHTHALMOLOGY | Admitting: OPHTHALMOLOGY
Payer: COMMERCIAL

## 2023-01-03 VITALS
RESPIRATION RATE: 16 BRPM | HEIGHT: 67 IN | WEIGHT: 167 LBS | DIASTOLIC BLOOD PRESSURE: 60 MMHG | BODY MASS INDEX: 26.21 KG/M2 | SYSTOLIC BLOOD PRESSURE: 136 MMHG | OXYGEN SATURATION: 96 % | HEART RATE: 71 BPM

## 2023-01-03 PROBLEM — H26.9 RIGHT CATARACT: Status: ACTIVE | Noted: 2023-01-03

## 2023-01-03 PROCEDURE — 2500000003 HC RX 250 WO HCPCS: Performed by: NURSE ANESTHETIST, CERTIFIED REGISTERED

## 2023-01-03 PROCEDURE — 3600000003 HC SURGERY LEVEL 3 BASE: Performed by: OPHTHALMOLOGY

## 2023-01-03 PROCEDURE — 2580000003 HC RX 258: Performed by: ANESTHESIOLOGY

## 2023-01-03 PROCEDURE — 3700000000 HC ANESTHESIA ATTENDED CARE: Performed by: OPHTHALMOLOGY

## 2023-01-03 PROCEDURE — 7100000010 HC PHASE II RECOVERY - FIRST 15 MIN: Performed by: OPHTHALMOLOGY

## 2023-01-03 PROCEDURE — 2709999900 HC NON-CHARGEABLE SUPPLY: Performed by: OPHTHALMOLOGY

## 2023-01-03 PROCEDURE — 6360000002 HC RX W HCPCS: Performed by: NURSE ANESTHETIST, CERTIFIED REGISTERED

## 2023-01-03 PROCEDURE — 6370000000 HC RX 637 (ALT 250 FOR IP): Performed by: OPHTHALMOLOGY

## 2023-01-03 PROCEDURE — V2632 POST CHMBR INTRAOCULAR LENS: HCPCS | Performed by: OPHTHALMOLOGY

## 2023-01-03 PROCEDURE — 7100000011 HC PHASE II RECOVERY - ADDTL 15 MIN: Performed by: OPHTHALMOLOGY

## 2023-01-03 PROCEDURE — 2500000003 HC RX 250 WO HCPCS: Performed by: OPHTHALMOLOGY

## 2023-01-03 DEVICE — LENS INTOCU +21.5 DIOPT A CONSTANT 118.8 L13MM DIA6MM 0DEG: Type: IMPLANTABLE DEVICE | Site: EYE | Status: FUNCTIONAL

## 2023-01-03 RX ORDER — TETRACAINE HYDROCHLORIDE 5 MG/ML
SOLUTION OPHTHALMIC PRN
Status: DISCONTINUED | OUTPATIENT
Start: 2023-01-03 | End: 2023-01-03 | Stop reason: HOSPADM

## 2023-01-03 RX ORDER — FLURBIPROFEN SODIUM 0.3 MG/ML
1 SOLUTION/ DROPS OPHTHALMIC
Status: DISPENSED | OUTPATIENT
Start: 2023-01-03 | End: 2023-01-03

## 2023-01-03 RX ORDER — BALANCED SALT SOLUTION 6.4; .75; .48; .3; 3.9; 1.7 MG/ML; MG/ML; MG/ML; MG/ML; MG/ML; MG/ML
SOLUTION OPHTHALMIC PRN
Status: DISCONTINUED | OUTPATIENT
Start: 2023-01-03 | End: 2023-01-03 | Stop reason: HOSPADM

## 2023-01-03 RX ORDER — SODIUM CHLORIDE, SODIUM LACTATE, POTASSIUM CHLORIDE, CALCIUM CHLORIDE 600; 310; 30; 20 MG/100ML; MG/100ML; MG/100ML; MG/100ML
INJECTION, SOLUTION INTRAVENOUS CONTINUOUS
Status: DISCONTINUED | OUTPATIENT
Start: 2023-01-03 | End: 2023-01-03 | Stop reason: HOSPADM

## 2023-01-03 RX ORDER — TETRACAINE HYDROCHLORIDE 5 MG/ML
1 SOLUTION OPHTHALMIC ONCE
Status: COMPLETED | OUTPATIENT
Start: 2023-01-03 | End: 2023-01-03

## 2023-01-03 RX ORDER — CYCLOPENTOLATE HYDROCHLORIDE 10 MG/ML
1 SOLUTION/ DROPS OPHTHALMIC
Status: DISPENSED | OUTPATIENT
Start: 2023-01-03 | End: 2023-01-03

## 2023-01-03 RX ORDER — PHENYLEPHRINE HYDROCHLORIDE 100 MG/ML
1 SOLUTION/ DROPS OPHTHALMIC PRN
Status: DISCONTINUED | OUTPATIENT
Start: 2023-01-03 | End: 2023-01-03 | Stop reason: HOSPADM

## 2023-01-03 RX ORDER — MIDAZOLAM HYDROCHLORIDE 1 MG/ML
INJECTION INTRAMUSCULAR; INTRAVENOUS PRN
Status: DISCONTINUED | OUTPATIENT
Start: 2023-01-03 | End: 2023-01-03 | Stop reason: SDUPTHER

## 2023-01-03 RX ORDER — ALFENTANIL HYDROCHLORIDE 500 UG/ML
INJECTION INTRAVENOUS PRN
Status: DISCONTINUED | OUTPATIENT
Start: 2023-01-03 | End: 2023-01-03 | Stop reason: SDUPTHER

## 2023-01-03 RX ORDER — PROPARACAINE HYDROCHLORIDE 5 MG/ML
1 SOLUTION/ DROPS OPHTHALMIC
Status: DISPENSED | OUTPATIENT
Start: 2023-01-03 | End: 2023-01-03

## 2023-01-03 RX ORDER — PHENYLEPHRINE HCL 2.5 %
1 DROPS OPHTHALMIC (EYE)
Status: DISPENSED | OUTPATIENT
Start: 2023-01-03 | End: 2023-01-03

## 2023-01-03 RX ADMIN — CYCLOPENTOLATE HYDROCHLORIDE 1 DROP: 10 SOLUTION/ DROPS OPHTHALMIC at 06:45

## 2023-01-03 RX ADMIN — TETRACAINE HYDROCHLORIDE 1 DROP: 25 LIQUID OPHTHALMIC at 07:08

## 2023-01-03 RX ADMIN — PHENYLEPHRINE HYDROCHLORIDE 1 DROP: 25 SOLUTION/ DROPS OPHTHALMIC at 06:45

## 2023-01-03 RX ADMIN — ALFENTANIL HYDROCHLORIDE 250 MCG: 500 INJECTION INTRAVENOUS at 08:04

## 2023-01-03 RX ADMIN — FLURBIPROFEN SODIUM 1 DROP: 0.3 SOLUTION/ DROPS OPHTHALMIC at 06:45

## 2023-01-03 RX ADMIN — ALFENTANIL HYDROCHLORIDE 250 MCG: 500 INJECTION INTRAVENOUS at 07:59

## 2023-01-03 RX ADMIN — ALFENTANIL HYDROCHLORIDE 250 MCG: 500 INJECTION INTRAVENOUS at 07:57

## 2023-01-03 RX ADMIN — MIDAZOLAM 2 MG: 1 INJECTION INTRAMUSCULAR; INTRAVENOUS at 07:57

## 2023-01-03 RX ADMIN — PROPARACAINE HYDROCHLORIDE 1 DROP: 5 SOLUTION/ DROPS OPHTHALMIC at 06:45

## 2023-01-03 RX ADMIN — PHENYLEPHRINE HYDROCHLORIDE 1 DROP: 100 SOLUTION/ DROPS OPHTHALMIC at 07:08

## 2023-01-03 RX ADMIN — SODIUM CHLORIDE, POTASSIUM CHLORIDE, SODIUM LACTATE AND CALCIUM CHLORIDE: 600; 310; 30; 20 INJECTION, SOLUTION INTRAVENOUS at 07:06

## 2023-01-03 ASSESSMENT — LIFESTYLE VARIABLES: SMOKING_STATUS: 0

## 2023-01-03 NOTE — H&P
Update History & Physical    The patient's History and Physical of 1 / 2 / 2023 was reviewed with the patient and there were no significant changes. I examined the patient and there were no significant changes from the previous History and Physical.    Plan: The risk, benefits, expected outcome, and alternative to the recommended procedure have been discussed with the patient. Patient understands and wants to proceed with the procedure.     Electronically signed by Harmeet Jo MD on 1/3/23 at 7:02 AM EST

## 2023-01-03 NOTE — DISCHARGE INSTRUCTIONS
Cataract Post-Op Instructions  Surya Springer M.D.  [phone]    Dr. Mile Strange has used the most modern surgical techniques during your cataract extraction; therefore, there are few restrictions. You may move your eye in any direction, watch TV, read, cook dinner, clean house, and go up and down steps. There are no physical restrictions, though you should avoid extreme exertion, do not drive day of surgery, and avoid swimming for three weeks. We make every attempt to provide a pain-free procedure. At times you may notice a dull headache or even a sharp pain. This is normal.  Should the discomfort persist, please call the office. If you see any flashes of light, floaters, or a black cloud over the eyes, call the office immediately. The vision in the operated eye will be blurry at first. Be patient. Your vision will improve dramatically over the next few days. You will see glares and halos around lights for the first day or so. This is a result of the dilating drops used for the surgery. You may also notice red or pink tinged vision. This is normal and will go away in a few days. Your eye will continue to heal over the next two to three weeks. At the end of the healing time you will see Dr. Mile Strange in the office to check your vision and determine your new glasses prescription. Resume regular diet and medications (unless otherwise instructed by your doctor). Medicine drops will be necessary to ensure as rapid healing as possible. These include:    Vigamox one drop three times a day  [place] one drop three times a day   Pred Forte one drop three times a day    Your post-op visit will be ___01/04/23_______ at ___01/04/23_______ at the office. Date                 Time    Your satisfaction is our primary concern. If you have any questions, please contact the office.   It is our pleasure to be your choice in eye care.    ***DO NOT RUB OR TOUCH THE OPERATIVE EYE***      If any problems occur or if you have any further questions, please call your doctor as soon as possible. If you find that you cannot reach your doctor but feel that your condition needs a doctors attention go to an emergency room. Nausea and Vomiting After Surgery: Care Instructions  Your Care Instructions     After you've had surgery, you may feel sick to your stomach (nauseated) or you may vomit. Sometimes anesthesia can make you feel sick. It's a common side effect and often doesn't last long. Pain also can make you feel sick or vomit. After the anesthesia wears off, you may feel pain from the incision (cut). That pain could then upset your stomach. Taking pain medicine can also make you feel sick to your stomach. Whatever the cause, you may get medicine that can help. There are also some things you can do at home to prevent nausea and feel better. The doctor has checked you carefully, but problems can develop later. If you notice any problems or new symptoms, get medical treatment right away. Follow-up care is a key part of your treatment and safety. Be sure to make and go to all appointments, and call your doctor if you are having problems. It's also a good idea to know your test results and keep a list of the medicines you take. How can you care for yourself at home? Be safe with medicines. Read and follow all instructions on the label. If the doctor gave you a prescription medicine for pain, take it as prescribed. If you are not taking a prescription pain medicine, ask your doctor if you can take an over-the-counter medicine. Take your pain medicine as soon as you have pain. It works better if you take it before the pain gets bad. Call your doctor if you have any problems with your medicine. Rest in bed until you feel better. To prevent dehydration, drink plenty of fluids.  Choose water and other clear liquids until you feel better. If you have kidney, heart, or liver disease and have to limit fluids, talk with your doctor before you increase the amount of fluids you drink. When you are able to eat, try clear soups, mild foods, and liquids until all symptoms are gone for 12 to 48 hours. Other good choices include dry toast, crackers, cooked cereal, and gelatin dessert, such as Jell-O. Do not smoke. Smoking and being around smoke can make nausea worse. If you need help quitting, talk to your doctor about stop-smoking programs and medicines. These can increase your chances of quitting for good. When should you call for help? Call 911  anytime you think you may need emergency care. For example, call if:    You passed out (lost consciousness). Call your doctor now or seek immediate medical care if:    You have new or worse nausea or vomiting. You are too sick to your stomach to drink any fluids. You cannot keep down fluids. You have symptoms of dehydration, such as:  Dry eyes and a dry mouth. Passing only a little urine. Feeling thirstier than usual.     Your pain medicine is not helping. You are dizzy or lightheaded, or you feel like you may faint. Watch closely for changes in your health, and be sure to contact your doctor if:    You do not get better as expected. Current as of: March 9, 2022               Content Version: 13.5  © 2006-2022 Healthwise, Incorporated. Care instructions adapted under license by [facility]'S \[facility] [place] [facility]\"". If you have questions about a medical condition or this instruction, always ask your healthcare professional. [place] [address] any warranty or liability for your use of this information. Infection After Surgery: Care Instructions  Overview  After surgery, an infection is always possible. It doesn't mean that the surgery didn't go well. Because an infection can be serious, your doctor has taken steps to manage it.   Your doctor checked the infection and cleaned it if necessary. Your doctor may have made an opening in the area so that the pus can drain out. You may have gauze in the cut so that the area will stay open and keep draining. You may need antibiotics. You will need to follow up with your doctor to make sure the infection has gone away. Follow-up care is a key part of your treatment and safety. Be sure to make and go to all appointments, and call your doctor if you are having problems. It's also a good idea to know your test results and keep a list of the medicines you take. How can you care for yourself at home? Make sure your surgeon knows about the infection, especially if you saw another doctor about your symptoms. If your doctor prescribed antibiotics, take them as directed. Do not stop taking them just because you feel better. You need to take the full course of antibiotics. Ask your doctor if you can take an over-the-counter pain medicine, such as acetaminophen (Tylenol), ibuprofen (Advil, Motrin), or naproxen (Aleve). Be safe with medicines. Read and follow all instructions on the label. Do not take two or more pain medicines at the same time unless the doctor told you to. Many pain medicines have acetaminophen, which is Tylenol. Too much acetaminophen (Tylenol) can be harmful. Prop up the area on a pillow anytime you sit or lie down during the next 3 days. Try to keep it above the level of your heart. This will help reduce swelling. Keep the skin clean and dry. You may have a bandage over the cut (incision). A bandage helps the incision heal and protects it. Your doctor will tell you how to take care of this. Keep it clean and dry. You may have drainage from the wound. If your doctor told you how to care for your incision, follow your doctor's instructions. If you did not get instructions, follow this general advice:  Wash around the incision with clean water 2 times a day.  Don't use hydrogen peroxide or alcohol, which can slow healing. When should you call for help? Call your doctor now or seek immediate medical care if:    You have signs that your infection is getting worse, such as: Increased pain, swelling, warmth, or redness in the area. Red streaks leading from the area. Pus draining from the wound. A new or higher fever. Watch closely for changes in your health, and be sure to contact your doctor if you have any problems. Where can you learn more? Go to http://www.woods.com/ and enter C340 to learn more about \"Infection After Surgery: Care Instructions. \"  Current as of: January 20, 2022               Content Version: 13.5  © 0080-1023 Healthwise, A [employer]. Care instructions adapted under license by [employer]. If you have questions about a medical condition or this instruction, always ask your healthcare professional. [address] any warranty or liability for your use of this information.

## 2023-01-03 NOTE — ANESTHESIA POSTPROCEDURE EVALUATION
Department of Anesthesiology  Postprocedure Note    Patient: Oswaldo Todd  MRN: [id number]  YOB: 1955  Date of evaluation: 1/3/2023      Procedure Summary     Date: 01/03/23 Room / Location: [address] [place] [address]    Anesthesia Start: 8151 Anesthesia Stop: 0809    Procedure: CATARACT EXTRACTION WITH IOL (Right) Diagnosis:       Combined forms of age-related cataract of right eye      (Combined forms of age-related cataract of right eye [H25.811])    Surgeons: Yoselin Campos MD Responsible Provider: Moiz Vela DO    Anesthesia Type: MAC ASA Status: 2          Anesthesia Type: MAC    Kevin Phase I: Kevin Score: 10    Kevin Phase II: Kevin Score: 10      Anesthesia Post Evaluation    Patient location during evaluation: PACU  Patient participation: complete - patient participated  Level of consciousness: awake and alert  Airway patency: patent  Nausea & Vomiting: no nausea and no vomiting  Complications: no  Cardiovascular status: hemodynamically stable  Respiratory status: acceptable  Hydration status: euvolemic

## 2023-01-03 NOTE — OP NOTE
PREOPERATIVE DIAGNOSIS:  Right Cataract    POSTOPERATIVE DIAGNOSIS:  Right Cataract    PROCEDURE:  Right phacoemulsification with intraocular lens implant. ANESTHESIA:  Local Mac    ESTIMATED BLOOD LOSS:  Minimal    COMPLICATIONS:  None    DESCRIPTION OF PROCEDURE:  The patient was brought into the operating room. The operative eye was marked, which was the right eye. The right eye was then prepped with a full-strength Betadine preparation. The periorbital area was copiously washed with Betadine. The lashes were washed with Betadine. Dilute Betadine was then also put in the inferior and superior fornices and left in place for approximately a minute or 2. This was then irrigated with sterile water. The face was then wiped and the preparation was repeated 1 more time. The drape was then placed over the operative eye with the sticky adhesive placed at the lid margins so that it draped the lashes out of the operative field superiorly and inferiorly, as well as isolated the meibomian glands behind the drape. This cleared the operative field of any meibomian gland secretions and eyelashes. Next, a lid speculum was positioned in the right eye. A super sharp blade was used to make a side-port incision at the 2 o'clock position. A clear corneal incision was fashioned over the 12 o;clock meridian with a 2.3mm keratome at the limbus. Healon was used to reform the anterior chamber. A continuous tear anterior capsulotomy was then performed with a bent needle cystotome. Hydrodissection was performed by irrigating with balanced salt solution through a syringe underneath the anterior capsular flap to loosen and allow free rotation of the lens nucleus. Phacoemulsification was used and the entire lens nucleus was removed. The I A unit was instilled in the eye, and the remaining cortex was removed. Healon was used to separate the anterior and posterior capsular flaps.   The PCBOO 21.5 diopter lens was then instilled into the capsular bag and dialed to 3 and 9 o'clock positions. Healon was removed from in front of and behind the lens. The lens was found to be well centered, well positioned in the bag and stable. The wound was checked and found to be airtight and watertight. The lid speculum was removed and the drape was removed. The patient was brought to the recovery room in excellent condition, given postoperative instructions, and discharge in excellent condition.   Operative Note      Patient: Asia Valladares  YOB: 1955  MRN: [id number]    Date of Procedure: 1/3/2023    Pre-Op Diagnosis: Combined forms of age-related cataract of right eye [H25.811]    Post-Op Diagnosis: Same       Procedure(s):  CATARACT EXTRACTION WITH IOL    Surgeon(s):  Cate Guido MD    Assistant:   * No surgical staff found *    Anesthesia: Monitor Anesthesia Care    Estimated Blood Loss (mL): Minimal    Complications: None    Specimens:   * No specimens in log *    Implants:  * No implants in log *      Drains: * No LDAs found *    Findings: Right cataract    Detailed Description of Procedure:   Right cataract extraction with intra ocular lens implant    Electronically signed by Emma Mesa MD on 1/3/2023 at 7:57 AM

## 2023-01-28 ENCOUNTER — HOSPITAL ENCOUNTER (EMERGENCY)
Age: 68
Discharge: HOME | End: 2023-01-28
Payer: COMMERCIAL

## 2023-01-28 VITALS
HEART RATE: 81 BPM | WEIGHT: 167 LBS | BODY MASS INDEX: 26.21 KG/M2 | OXYGEN SATURATION: 99 % | HEIGHT: 67 IN | DIASTOLIC BLOOD PRESSURE: 75 MMHG | RESPIRATION RATE: 18 BRPM | SYSTOLIC BLOOD PRESSURE: 153 MMHG | TEMPERATURE: 98.7 F

## 2023-01-28 DIAGNOSIS — L03.90 CELLULITIS, UNSPECIFIED CELLULITIS SITE: Primary | ICD-10-CM

## 2023-01-28 PROCEDURE — 99211 OFF/OP EST MAY X REQ PHY/QHP: CPT

## 2023-01-28 RX ORDER — METHYLPREDNISOLONE 4 MG/1
TABLET ORAL
Qty: 21 TABLET | Refills: 0 | Status: SHIPPED | OUTPATIENT
Start: 2023-01-28

## 2023-01-28 RX ORDER — CEPHALEXIN 500 MG/1
500 CAPSULE ORAL 4 TIMES DAILY
Qty: 28 CAPSULE | Refills: 0 | Status: SHIPPED | OUTPATIENT
Start: 2023-01-28 | End: 2023-02-04

## 2023-01-28 ASSESSMENT — PAIN - FUNCTIONAL ASSESSMENT: PAIN_FUNCTIONAL_ASSESSMENT: NONE - DENIES PAIN

## 2023-01-28 ASSESSMENT — PAIN SCALES - GENERAL: PAINLEVEL_OUTOF10: 0
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
## 2023-11-08 ENCOUNTER — HOSPITAL ENCOUNTER (EMERGENCY)
Age: 68
Discharge: HOME | End: 2023-11-08
Payer: COMMERCIAL

## 2023-11-08 VITALS
WEIGHT: 170 LBS | BODY MASS INDEX: 26.63 KG/M2 | TEMPERATURE: 98.6 F | HEART RATE: 67 BPM | OXYGEN SATURATION: 99 % | SYSTOLIC BLOOD PRESSURE: 139 MMHG | RESPIRATION RATE: 18 BRPM | DIASTOLIC BLOOD PRESSURE: 88 MMHG

## 2023-11-08 DIAGNOSIS — H66.90 ACUTE OTITIS MEDIA, UNSPECIFIED OTITIS MEDIA TYPE: Primary | ICD-10-CM

## 2023-11-08 PROCEDURE — 99211 OFF/OP EST MAY X REQ PHY/QHP: CPT

## 2023-11-08 RX ORDER — AMOXICILLIN AND CLAVULANATE POTASSIUM 875; 125 MG/1; MG/1
1 TABLET, FILM COATED ORAL 2 TIMES DAILY
Qty: 14 TABLET | Refills: 0 | Status: SHIPPED | OUTPATIENT
Start: 2023-11-08 | End: 2023-11-15

## 2023-11-08 ASSESSMENT — PAIN DESCRIPTION - LOCATION: LOCATION: EAR

## 2023-11-08 ASSESSMENT — PAIN SCALES - GENERAL: PAINLEVEL_OUTOF10: 5

## 2023-11-08 ASSESSMENT — PAIN DESCRIPTION - ORIENTATION: ORIENTATION: RIGHT

## 2023-11-08 ASSESSMENT — PAIN DESCRIPTION - DESCRIPTORS: DESCRIPTORS: ACHING;DISCOMFORT

## 2023-11-08 ASSESSMENT — PAIN - FUNCTIONAL ASSESSMENT: PAIN_FUNCTIONAL_ASSESSMENT: 0-10

## 2023-11-20 ENCOUNTER — HOSPITAL ENCOUNTER (EMERGENCY)
Age: 68
Discharge: HOME | End: 2023-11-20
Payer: COMMERCIAL

## 2023-11-20 VITALS
TEMPERATURE: 98.2 F | HEART RATE: 62 BPM | OXYGEN SATURATION: 98 % | BODY MASS INDEX: 26.63 KG/M2 | SYSTOLIC BLOOD PRESSURE: 162 MMHG | WEIGHT: 170 LBS | RESPIRATION RATE: 18 BRPM | DIASTOLIC BLOOD PRESSURE: 80 MMHG

## 2023-11-20 DIAGNOSIS — R51.9 FACIAL PAIN: Primary | ICD-10-CM

## 2023-11-20 PROCEDURE — 99211 OFF/OP EST MAY X REQ PHY/QHP: CPT

## 2023-11-20 RX ORDER — METHYLPREDNISOLONE 4 MG/1
TABLET ORAL
Qty: 1 KIT | Refills: 0 | Status: SHIPPED | OUTPATIENT
Start: 2023-11-20

## 2023-11-20 ASSESSMENT — PAIN - FUNCTIONAL ASSESSMENT: PAIN_FUNCTIONAL_ASSESSMENT: NONE - DENIES PAIN

## 2024-03-29 ENCOUNTER — HOSPITAL ENCOUNTER (OUTPATIENT)
Age: 69
Discharge: HOME | End: 2024-03-29
Payer: COMMERCIAL

## 2024-03-29 LAB
BASOPHILS # BLD: 0.06 K/UL (ref 0–0.2)
BASOPHILS NFR BLD: 1 % (ref 0–2)
EOSINOPHIL # BLD: 0.17 K/UL (ref 0.05–0.5)
EOSINOPHILS RELATIVE PERCENT: 3 % (ref 0–6)
ERYTHROCYTE [DISTWIDTH] IN BLOOD BY AUTOMATED COUNT: 16.6 % (ref 11.5–15)
HCT VFR BLD AUTO: 55.2 % (ref 37–54)
HGB BLD-MCNC: 17.8 G/DL (ref 12.5–16.5)
IMM GRANULOCYTES # BLD AUTO: <0.03 K/UL (ref 0–0.58)
IMM GRANULOCYTES NFR BLD: 0 % (ref 0–5)
LYMPHOCYTES NFR BLD: 1.55 K/UL (ref 1.5–4)
LYMPHOCYTES RELATIVE PERCENT: 31 % (ref 20–42)
MCH RBC QN AUTO: 28.3 PG (ref 26–35)
MCHC RBC AUTO-ENTMCNC: 32.2 G/DL (ref 32–34.5)
MCV RBC AUTO: 87.9 FL (ref 80–99.9)
MISCELLANEOUS LAB TEST RESULT: NORMAL
MONOCYTES NFR BLD: 0.47 K/UL (ref 0.1–0.95)
MONOCYTES NFR BLD: 9 % (ref 2–12)
NEUTROPHILS NFR BLD: 55 % (ref 43–80)
NEUTS SEG NFR BLD: 2.73 K/UL (ref 1.8–7.3)
PLATELET # BLD AUTO: 193 K/UL (ref 130–450)
PMV BLD AUTO: 10.5 FL (ref 7–12)
RBC # BLD AUTO: 6.28 M/UL (ref 3.8–5.8)
T3FREE SERPL-MCNC: 2.58 PG/ML (ref 2–4.4)
TEST NAME: NORMAL
WBC OTHER # BLD: 5 K/UL (ref 4.5–11.5)

## 2024-03-29 PROCEDURE — 84270 ASSAY OF SEX HORMONE GLOBUL: CPT

## 2024-03-29 PROCEDURE — 84481 FREE ASSAY (FT-3): CPT

## 2024-03-29 PROCEDURE — 36415 COLL VENOUS BLD VENIPUNCTURE: CPT

## 2024-03-29 PROCEDURE — 85025 COMPLETE CBC W/AUTO DIFF WBC: CPT

## 2024-03-29 PROCEDURE — 82670 ASSAY OF TOTAL ESTRADIOL: CPT

## 2024-03-29 PROCEDURE — 84403 ASSAY OF TOTAL TESTOSTERONE: CPT

## 2024-04-01 LAB
MISCELLANEOUS LAB TEST RESULT: NORMAL
TEST NAME: NORMAL

## 2024-04-02 LAB
SHBG SERPL-SCNC: 27 NMOL/L (ref 19–76)
TESTOST FREE MFR SERPL: 13.6 PG/ML (ref 47–244)
TESTOST SERPL-MCNC: 67 NG/DL (ref 193–740)

## 2024-08-26 ENCOUNTER — ANESTHESIA (OUTPATIENT)
Dept: OPERATING ROOM | Age: 69
DRG: 580 | End: 2024-08-26
Payer: COMMERCIAL

## 2024-08-26 ENCOUNTER — APPOINTMENT (OUTPATIENT)
Dept: GENERAL RADIOLOGY | Age: 69
DRG: 580 | End: 2024-08-26
Payer: COMMERCIAL

## 2024-08-26 ENCOUNTER — ANESTHESIA EVENT (OUTPATIENT)
Dept: OPERATING ROOM | Age: 69
DRG: 580 | End: 2024-08-26
Payer: COMMERCIAL

## 2024-08-26 ENCOUNTER — HOSPITAL ENCOUNTER (INPATIENT)
Age: 69
LOS: 1 days | Discharge: HOME | DRG: 580 | End: 2024-08-27
Attending: STUDENT IN AN ORGANIZED HEALTH CARE EDUCATION/TRAINING PROGRAM | Admitting: STUDENT IN AN ORGANIZED HEALTH CARE EDUCATION/TRAINING PROGRAM
Payer: COMMERCIAL

## 2024-08-26 DIAGNOSIS — S81.811A: Primary | ICD-10-CM

## 2024-08-26 PROBLEM — T14.90XA TRAUMA: Status: ACTIVE | Noted: 2024-08-26

## 2024-08-26 LAB
ABO + RH BLD: NORMAL
ALBUMIN SERPL-MCNC: 3.7 G/DL (ref 3.5–5.2)
ALP SERPL-CCNC: 106 U/L (ref 40–129)
ALT SERPL-CCNC: 30 U/L (ref 0–40)
ANION GAP SERPL CALCULATED.3IONS-SCNC: 15 MMOL/L (ref 7–16)
APAP SERPL-MCNC: <5 UG/ML (ref 10–30)
ARM BAND NUMBER: NORMAL
AST SERPL-CCNC: 32 U/L (ref 0–39)
B.E.: -4.2 MMOL/L (ref -3–3)
BASOPHILS # BLD: 0.12 K/UL (ref 0–0.2)
BASOPHILS NFR BLD: 2 % (ref 0–2)
BILIRUB SERPL-MCNC: 0.2 MG/DL (ref 0–1.2)
BLOOD BANK SAMPLE EXPIRATION: NORMAL
BLOOD GROUP ANTIBODIES SERPL: NEGATIVE
BUN SERPL-MCNC: 25 MG/DL (ref 6–23)
CALCIUM SERPL-MCNC: 8.6 MG/DL (ref 8.6–10.2)
CHLORIDE SERPL-SCNC: 106 MMOL/L (ref 98–107)
CLOT ANGLE.KAOLIN INDUCED BLD RES TEG: 70.6 DEG (ref 53–70)
CO2 SERPL-SCNC: 19 MMOL/L (ref 22–29)
COHB: 0.3 % (ref 0–1.5)
CREAT SERPL-MCNC: 1.1 MG/DL (ref 0.7–1.2)
CRITICAL: ABNORMAL
DATE ANALYZED: ABNORMAL
DATE OF COLLECTION: ABNORMAL
EOSINOPHIL # BLD: 0.17 K/UL (ref 0.05–0.5)
EOSINOPHILS RELATIVE PERCENT: 2 % (ref 0–6)
EPL-TEG: 0 % (ref 0–15)
ERYTHROCYTE [DISTWIDTH] IN BLOOD BY AUTOMATED COUNT: 14.3 % (ref 11.5–15)
ETHANOLAMINE SERPL-MCNC: <10 MG/DL (ref 0–0.08)
G-TEG: 8.7 KDYN/CM2 (ref 4.5–11)
GFR, ESTIMATED: 69 ML/MIN/1.73M2
GLUCOSE SERPL-MCNC: 145 MG/DL (ref 74–99)
HCO3: 19.1 MMOL/L (ref 22–26)
HCT VFR BLD AUTO: 42.8 % (ref 37–54)
HGB BLD-MCNC: 13.4 G/DL (ref 12.5–16.5)
HHB: 0.6 % (ref 0–5)
IMM GRANULOCYTES # BLD AUTO: 0.04 K/UL (ref 0–0.58)
IMM GRANULOCYTES NFR BLD: 1 % (ref 0–5)
INR PPP: 1.2
KINETICS TEG: 1.3 MIN (ref 1–3)
LAB: ABNORMAL
LACTATE BLDV-SCNC: 3.8 MMOL/L (ref 0.5–2.2)
LY30 (LYSIS) TEG: 0 % (ref 0–8)
LYMPHOCYTES NFR BLD: 2.46 K/UL (ref 1.5–4)
LYMPHOCYTES RELATIVE PERCENT: 30 % (ref 20–42)
Lab: 1845
MA (MAX CLOT) TEG: 63.4 MM (ref 50–70)
MCH RBC QN AUTO: 27.2 PG (ref 26–35)
MCHC RBC AUTO-ENTMCNC: 31.3 G/DL (ref 32–34.5)
MCV RBC AUTO: 87 FL (ref 80–99.9)
METHB: 0.8 % (ref 0–1.5)
MODE: ABNORMAL
MONOCYTES NFR BLD: 0.6 K/UL (ref 0.1–0.95)
MONOCYTES NFR BLD: 7 % (ref 2–12)
NEUTROPHILS NFR BLD: 58 % (ref 43–80)
NEUTS SEG NFR BLD: 4.7 K/UL (ref 1.8–7.3)
O2 SATURATION: 99.4 % (ref 92–98.5)
O2HB: 98.3 % (ref 94–97)
OPERATOR ID: 8215
PARTIAL THROMBOPLASTIN TIME: 23.4 SEC (ref 24.5–35.1)
PATIENT TEMP: 37 C
PCO2: 30.4 MMHG (ref 35–45)
PH BLOOD GAS: 7.42 (ref 7.35–7.45)
PLATELET # BLD AUTO: 255 K/UL (ref 130–450)
PMV BLD AUTO: 11 FL (ref 7–12)
PO2: 381.8 MMHG (ref 75–100)
POTASSIUM SERPL-SCNC: 3.74 MMOL/L (ref 3.5–5)
POTASSIUM SERPL-SCNC: 4.2 MMOL/L (ref 3.5–5)
PROT SERPL-MCNC: 6.4 G/DL (ref 6.4–8.3)
PROTHROMBIN TIME: 12.8 SEC (ref 9.3–12.4)
RBC # BLD AUTO: 4.92 M/UL (ref 3.8–5.8)
REACTION TIME TEG: 3.2 MIN (ref 5–10)
SALICYLATES SERPL-MCNC: <0.3 MG/DL (ref 0–30)
SODIUM SERPL-SCNC: 140 MMOL/L (ref 132–146)
SOURCE, BLOOD GAS: ABNORMAL
THB: 14.7 G/DL (ref 11.5–16.5)
TIME ANALYZED: 1852
TOXIC TRICYCLIC SC,BLOOD: NEGATIVE
WBC OTHER # BLD: 8.1 K/UL (ref 4.5–11.5)

## 2024-08-26 PROCEDURE — 85390 FIBRINOLYSINS SCREEN I&R: CPT

## 2024-08-26 PROCEDURE — 85025 COMPLETE CBC W/AUTO DIFF WBC: CPT

## 2024-08-26 PROCEDURE — 86850 RBC ANTIBODY SCREEN: CPT

## 2024-08-26 PROCEDURE — 0YQH3ZZ REPAIR RIGHT LOWER LEG, PERCUTANEOUS APPROACH: ICD-10-PCS | Performed by: STUDENT IN AN ORGANIZED HEALTH CARE EDUCATION/TRAINING PROGRAM

## 2024-08-26 PROCEDURE — 73590 X-RAY EXAM OF LOWER LEG: CPT

## 2024-08-26 PROCEDURE — 85730 THROMBOPLASTIN TIME PARTIAL: CPT

## 2024-08-26 PROCEDURE — 7100000000 HC PACU RECOVERY - FIRST 15 MIN: Performed by: STUDENT IN AN ORGANIZED HEALTH CARE EDUCATION/TRAINING PROGRAM

## 2024-08-26 PROCEDURE — 6360000002 HC RX W HCPCS

## 2024-08-26 PROCEDURE — 84132 ASSAY OF SERUM POTASSIUM: CPT

## 2024-08-26 PROCEDURE — 83605 ASSAY OF LACTIC ACID: CPT

## 2024-08-26 PROCEDURE — 36600 WITHDRAWAL OF ARTERIAL BLOOD: CPT | Performed by: STUDENT IN AN ORGANIZED HEALTH CARE EDUCATION/TRAINING PROGRAM

## 2024-08-26 PROCEDURE — 2580000003 HC RX 258: Performed by: STUDENT IN AN ORGANIZED HEALTH CARE EDUCATION/TRAINING PROGRAM

## 2024-08-26 PROCEDURE — 3700000001 HC ADD 15 MINUTES (ANESTHESIA): Performed by: STUDENT IN AN ORGANIZED HEALTH CARE EDUCATION/TRAINING PROGRAM

## 2024-08-26 PROCEDURE — 82805 BLOOD GASES W/O2 SATURATION: CPT

## 2024-08-26 PROCEDURE — 2500000003 HC RX 250 WO HCPCS: Performed by: ANESTHESIOLOGIST ASSISTANT

## 2024-08-26 PROCEDURE — 99223 1ST HOSP IP/OBS HIGH 75: CPT | Performed by: STUDENT IN AN ORGANIZED HEALTH CARE EDUCATION/TRAINING PROGRAM

## 2024-08-26 PROCEDURE — 80053 COMPREHEN METABOLIC PANEL: CPT

## 2024-08-26 PROCEDURE — 20103 EXPL PENTRG WOUND EXTREMITY: CPT | Performed by: STUDENT IN AN ORGANIZED HEALTH CARE EDUCATION/TRAINING PROGRAM

## 2024-08-26 PROCEDURE — 6810039001 HC L1 TRAUMA PRIORITY

## 2024-08-26 PROCEDURE — 99285 EMERGENCY DEPT VISIT HI MDM: CPT

## 2024-08-26 PROCEDURE — 2140000000 HC CCU INTERMEDIATE R&B

## 2024-08-26 PROCEDURE — 0Y3H3ZZ CONTROL BLEEDING IN RIGHT LOWER LEG, PERCUTANEOUS APPROACH: ICD-10-PCS | Performed by: STUDENT IN AN ORGANIZED HEALTH CARE EDUCATION/TRAINING PROGRAM

## 2024-08-26 PROCEDURE — 7100000001 HC PACU RECOVERY - ADDTL 15 MIN: Performed by: STUDENT IN AN ORGANIZED HEALTH CARE EDUCATION/TRAINING PROGRAM

## 2024-08-26 PROCEDURE — 6360000002 HC RX W HCPCS: Performed by: ANESTHESIOLOGIST ASSISTANT

## 2024-08-26 PROCEDURE — 86900 BLOOD TYPING SEROLOGIC ABO: CPT

## 2024-08-26 PROCEDURE — 2709999900 HC NON-CHARGEABLE SUPPLY: Performed by: STUDENT IN AN ORGANIZED HEALTH CARE EDUCATION/TRAINING PROGRAM

## 2024-08-26 PROCEDURE — 86901 BLOOD TYPING SEROLOGIC RH(D): CPT

## 2024-08-26 PROCEDURE — 12044 INTMD RPR N-HF/GENIT7.6-12.5: CPT | Performed by: STUDENT IN AN ORGANIZED HEALTH CARE EDUCATION/TRAINING PROGRAM

## 2024-08-26 PROCEDURE — 85384 FIBRINOGEN ACTIVITY: CPT

## 2024-08-26 PROCEDURE — 85610 PROTHROMBIN TIME: CPT

## 2024-08-26 PROCEDURE — 3700000000 HC ANESTHESIA ATTENDED CARE: Performed by: STUDENT IN AN ORGANIZED HEALTH CARE EDUCATION/TRAINING PROGRAM

## 2024-08-26 PROCEDURE — G0480 DRUG TEST DEF 1-7 CLASSES: HCPCS

## 2024-08-26 PROCEDURE — 80143 DRUG ASSAY ACETAMINOPHEN: CPT

## 2024-08-26 PROCEDURE — 3600000003 HC SURGERY LEVEL 3 BASE: Performed by: STUDENT IN AN ORGANIZED HEALTH CARE EDUCATION/TRAINING PROGRAM

## 2024-08-26 PROCEDURE — 90714 TD VACC NO PRESV 7 YRS+ IM: CPT

## 2024-08-26 PROCEDURE — 85576 BLOOD PLATELET AGGREGATION: CPT

## 2024-08-26 PROCEDURE — 80307 DRUG TEST PRSMV CHEM ANLYZR: CPT

## 2024-08-26 PROCEDURE — 2580000003 HC RX 258: Performed by: ANESTHESIOLOGIST ASSISTANT

## 2024-08-26 PROCEDURE — 90471 IMMUNIZATION ADMIN: CPT

## 2024-08-26 PROCEDURE — 80179 DRUG ASSAY SALICYLATE: CPT

## 2024-08-26 PROCEDURE — 96374 THER/PROPH/DIAG INJ IV PUSH: CPT

## 2024-08-26 PROCEDURE — 2580000003 HC RX 258

## 2024-08-26 PROCEDURE — P9041 ALBUMIN (HUMAN),5%, 50ML: HCPCS | Performed by: ANESTHESIOLOGIST ASSISTANT

## 2024-08-26 PROCEDURE — 3600000013 HC SURGERY LEVEL 3 ADDTL 15MIN: Performed by: STUDENT IN AN ORGANIZED HEALTH CARE EDUCATION/TRAINING PROGRAM

## 2024-08-26 PROCEDURE — 36415 COLL VENOUS BLD VENIPUNCTURE: CPT

## 2024-08-26 PROCEDURE — 37618 LIGATION MAJOR ARTERY XTR: CPT | Performed by: STUDENT IN AN ORGANIZED HEALTH CARE EDUCATION/TRAINING PROGRAM

## 2024-08-26 PROCEDURE — 85347 COAGULATION TIME ACTIVATED: CPT

## 2024-08-26 RX ORDER — ONDANSETRON 4 MG/1
4 TABLET, ORALLY DISINTEGRATING ORAL EVERY 8 HOURS PRN
Status: DISCONTINUED | OUTPATIENT
Start: 2024-08-26 | End: 2024-08-27 | Stop reason: HOSPADM

## 2024-08-26 RX ORDER — SODIUM CHLORIDE, SODIUM LACTATE, POTASSIUM CHLORIDE, CALCIUM CHLORIDE 600; 310; 30; 20 MG/100ML; MG/100ML; MG/100ML; MG/100ML
INJECTION, SOLUTION INTRAVENOUS CONTINUOUS PRN
Status: DISCONTINUED | OUTPATIENT
Start: 2024-08-26 | End: 2024-08-26 | Stop reason: SDUPTHER

## 2024-08-26 RX ORDER — NALOXONE HYDROCHLORIDE 0.4 MG/ML
INJECTION, SOLUTION INTRAMUSCULAR; INTRAVENOUS; SUBCUTANEOUS PRN
Status: DISCONTINUED | OUTPATIENT
Start: 2024-08-26 | End: 2024-08-26 | Stop reason: HOSPADM

## 2024-08-26 RX ORDER — SODIUM CHLORIDE 0.9 % (FLUSH) 0.9 %
5-40 SYRINGE (ML) INJECTION PRN
Status: DISCONTINUED | OUTPATIENT
Start: 2024-08-26 | End: 2024-08-26 | Stop reason: HOSPADM

## 2024-08-26 RX ORDER — SODIUM CHLORIDE 0.9 % (FLUSH) 0.9 %
5-40 SYRINGE (ML) INJECTION EVERY 12 HOURS SCHEDULED
Status: DISCONTINUED | OUTPATIENT
Start: 2024-08-26 | End: 2024-08-26 | Stop reason: HOSPADM

## 2024-08-26 RX ORDER — DIPHENHYDRAMINE HYDROCHLORIDE 50 MG/ML
12.5 INJECTION INTRAMUSCULAR; INTRAVENOUS
Status: DISCONTINUED | OUTPATIENT
Start: 2024-08-26 | End: 2024-08-26 | Stop reason: HOSPADM

## 2024-08-26 RX ORDER — SODIUM CHLORIDE 9 MG/ML
INJECTION, SOLUTION INTRAVENOUS PRN
Status: DISCONTINUED | OUTPATIENT
Start: 2024-08-26 | End: 2024-08-27 | Stop reason: HOSPADM

## 2024-08-26 RX ORDER — ONDANSETRON 2 MG/ML
4 INJECTION INTRAMUSCULAR; INTRAVENOUS EVERY 6 HOURS PRN
Status: DISCONTINUED | OUTPATIENT
Start: 2024-08-26 | End: 2024-08-27 | Stop reason: HOSPADM

## 2024-08-26 RX ORDER — SODIUM CHLORIDE 0.9 % (FLUSH) 0.9 %
10 SYRINGE (ML) INJECTION PRN
Status: DISCONTINUED | OUTPATIENT
Start: 2024-08-26 | End: 2024-08-27 | Stop reason: HOSPADM

## 2024-08-26 RX ORDER — FENTANYL CITRATE 50 UG/ML
INJECTION, SOLUTION INTRAMUSCULAR; INTRAVENOUS PRN
Status: DISCONTINUED | OUTPATIENT
Start: 2024-08-26 | End: 2024-08-26 | Stop reason: SDUPTHER

## 2024-08-26 RX ORDER — SODIUM CHLORIDE 9 MG/ML
INJECTION, SOLUTION INTRAVENOUS CONTINUOUS PRN
Status: DISCONTINUED | OUTPATIENT
Start: 2024-08-26 | End: 2024-08-26 | Stop reason: SDUPTHER

## 2024-08-26 RX ORDER — LIDOCAINE HYDROCHLORIDE 20 MG/ML
INJECTION, SOLUTION INTRAVENOUS PRN
Status: DISCONTINUED | OUTPATIENT
Start: 2024-08-26 | End: 2024-08-26 | Stop reason: SDUPTHER

## 2024-08-26 RX ORDER — ACETAMINOPHEN 325 MG/1
650 TABLET ORAL EVERY 6 HOURS
Status: DISCONTINUED | OUTPATIENT
Start: 2024-08-26 | End: 2024-08-27 | Stop reason: HOSPADM

## 2024-08-26 RX ORDER — HYDROMORPHONE HYDROCHLORIDE 1 MG/ML
0.5 INJECTION, SOLUTION INTRAMUSCULAR; INTRAVENOUS; SUBCUTANEOUS EVERY 5 MIN PRN
Status: DISCONTINUED | OUTPATIENT
Start: 2024-08-26 | End: 2024-08-26 | Stop reason: HOSPADM

## 2024-08-26 RX ORDER — SODIUM CHLORIDE 9 MG/ML
INJECTION, SOLUTION INTRAVENOUS PRN
Status: DISCONTINUED | OUTPATIENT
Start: 2024-08-26 | End: 2024-08-26 | Stop reason: HOSPADM

## 2024-08-26 RX ORDER — MEPERIDINE HYDROCHLORIDE 25 MG/ML
INJECTION INTRAMUSCULAR; INTRAVENOUS; SUBCUTANEOUS
Status: COMPLETED
Start: 2024-08-26 | End: 2024-08-26

## 2024-08-26 RX ORDER — OXYCODONE HYDROCHLORIDE 10 MG/1
10 TABLET ORAL EVERY 4 HOURS PRN
Status: DISCONTINUED | OUTPATIENT
Start: 2024-08-26 | End: 2024-08-27 | Stop reason: HOSPADM

## 2024-08-26 RX ORDER — ONDANSETRON 2 MG/ML
INJECTION INTRAMUSCULAR; INTRAVENOUS PRN
Status: DISCONTINUED | OUTPATIENT
Start: 2024-08-26 | End: 2024-08-26 | Stop reason: SDUPTHER

## 2024-08-26 RX ORDER — PROCHLORPERAZINE EDISYLATE 5 MG/ML
10 INJECTION INTRAMUSCULAR; INTRAVENOUS EVERY 6 HOURS PRN
Status: DISCONTINUED | OUTPATIENT
Start: 2024-08-26 | End: 2024-08-27 | Stop reason: HOSPADM

## 2024-08-26 RX ORDER — PHENYLEPHRINE HCL IN 0.9% NACL 1 MG/10 ML
SYRINGE (ML) INTRAVENOUS PRN
Status: DISCONTINUED | OUTPATIENT
Start: 2024-08-26 | End: 2024-08-26 | Stop reason: SDUPTHER

## 2024-08-26 RX ORDER — OXYCODONE HYDROCHLORIDE 5 MG/1
5 TABLET ORAL EVERY 4 HOURS PRN
Status: DISCONTINUED | OUTPATIENT
Start: 2024-08-26 | End: 2024-08-27 | Stop reason: HOSPADM

## 2024-08-26 RX ORDER — METHOCARBAMOL 750 MG/1
750 TABLET, FILM COATED ORAL 4 TIMES DAILY
Status: DISCONTINUED | OUTPATIENT
Start: 2024-08-26 | End: 2024-08-27 | Stop reason: HOSPADM

## 2024-08-26 RX ORDER — PROPOFOL 10 MG/ML
INJECTION, EMULSION INTRAVENOUS PRN
Status: DISCONTINUED | OUTPATIENT
Start: 2024-08-26 | End: 2024-08-26 | Stop reason: SDUPTHER

## 2024-08-26 RX ORDER — POLYETHYLENE GLYCOL 3350 17 G/17G
17 POWDER, FOR SOLUTION ORAL DAILY
Status: DISCONTINUED | OUTPATIENT
Start: 2024-08-27 | End: 2024-08-27 | Stop reason: HOSPADM

## 2024-08-26 RX ORDER — ALBUMIN, HUMAN INJ 5% 5 %
SOLUTION INTRAVENOUS PRN
Status: DISCONTINUED | OUTPATIENT
Start: 2024-08-26 | End: 2024-08-26 | Stop reason: SDUPTHER

## 2024-08-26 RX ORDER — MEPERIDINE HYDROCHLORIDE 25 MG/ML
12.5 INJECTION INTRAMUSCULAR; INTRAVENOUS; SUBCUTANEOUS EVERY 5 MIN PRN
Status: DISCONTINUED | OUTPATIENT
Start: 2024-08-26 | End: 2024-08-26 | Stop reason: HOSPADM

## 2024-08-26 RX ORDER — HYDROMORPHONE HYDROCHLORIDE 1 MG/ML
0.25 INJECTION, SOLUTION INTRAMUSCULAR; INTRAVENOUS; SUBCUTANEOUS EVERY 5 MIN PRN
Status: DISCONTINUED | OUTPATIENT
Start: 2024-08-26 | End: 2024-08-26 | Stop reason: HOSPADM

## 2024-08-26 RX ORDER — SUCCINYLCHOLINE/SOD CL,ISO/PF 100 MG/5ML
SYRINGE (ML) INTRAVENOUS PRN
Status: DISCONTINUED | OUTPATIENT
Start: 2024-08-26 | End: 2024-08-26 | Stop reason: SDUPTHER

## 2024-08-26 RX ORDER — SODIUM CHLORIDE 0.9 % (FLUSH) 0.9 %
10 SYRINGE (ML) INJECTION EVERY 12 HOURS SCHEDULED
Status: DISCONTINUED | OUTPATIENT
Start: 2024-08-26 | End: 2024-08-27 | Stop reason: HOSPADM

## 2024-08-26 RX ADMIN — ONDANSETRON 4 MG: 2 INJECTION INTRAMUSCULAR; INTRAVENOUS at 19:28

## 2024-08-26 RX ADMIN — CEFAZOLIN 2000 MG: 2 INJECTION, POWDER, FOR SOLUTION INTRAMUSCULAR; INTRAVENOUS at 18:47

## 2024-08-26 RX ADMIN — PROPOFOL 110 MG: 10 INJECTION, EMULSION INTRAVENOUS at 19:03

## 2024-08-26 RX ADMIN — Medication 200 MCG: at 19:08

## 2024-08-26 RX ADMIN — ALBUMIN (HUMAN) 25 G: 12.5 INJECTION, SOLUTION INTRAVENOUS at 19:34

## 2024-08-26 RX ADMIN — Medication 30 MG: at 19:03

## 2024-08-26 RX ADMIN — MEPERIDINE HYDROCHLORIDE 12.5 MG: 25 INJECTION INTRAMUSCULAR; INTRAVENOUS; SUBCUTANEOUS at 20:24

## 2024-08-26 RX ADMIN — SODIUM CHLORIDE, PRESERVATIVE FREE 10 ML: 5 INJECTION INTRAVENOUS at 22:39

## 2024-08-26 RX ADMIN — SODIUM CHLORIDE: 9 INJECTION, SOLUTION INTRAVENOUS at 19:53

## 2024-08-26 RX ADMIN — FENTANYL CITRATE 100 MCG: 0.05 INJECTION, SOLUTION INTRAMUSCULAR; INTRAVENOUS at 19:03

## 2024-08-26 RX ADMIN — SODIUM CHLORIDE, POTASSIUM CHLORIDE, SODIUM LACTATE AND CALCIUM CHLORIDE: 600; 310; 30; 20 INJECTION, SOLUTION INTRAVENOUS at 18:55

## 2024-08-26 RX ADMIN — Medication 150 MCG: at 19:23

## 2024-08-26 RX ADMIN — Medication 150 MCG: at 19:54

## 2024-08-26 RX ADMIN — Medication 100 MCG: at 19:42

## 2024-08-26 RX ADMIN — Medication 150 MCG: at 19:30

## 2024-08-26 RX ADMIN — SODIUM CHLORIDE, SODIUM LACTATE, POTASSIUM CHLORIDE, CALCIUM CHLORIDE: 600; 310; 30; 20 INJECTION, SOLUTION INTRAVENOUS at 19:02

## 2024-08-26 RX ADMIN — FENTANYL CITRATE 50 MCG: 0.05 INJECTION, SOLUTION INTRAMUSCULAR; INTRAVENOUS at 20:08

## 2024-08-26 RX ADMIN — PROCHLORPERAZINE EDISYLATE 10 MG: 5 INJECTION INTRAMUSCULAR; INTRAVENOUS at 22:00

## 2024-08-26 RX ADMIN — CLOSTRIDIUM TETANI TOXOID ANTIGEN (FORMALDEHYDE INACTIVATED) AND CORYNEBACTERIUM DIPHTHERIAE TOXOID ANTIGEN (FORMALDEHYDE INACTIVATED) 0.5 ML: 5; 2 INJECTION, SUSPENSION INTRAMUSCULAR at 18:48

## 2024-08-26 RX ADMIN — Medication 150 MCG: at 19:35

## 2024-08-26 RX ADMIN — Medication 100 MCG: at 19:47

## 2024-08-26 RX ADMIN — LIDOCAINE HYDROCHLORIDE 100 MG: 20 INJECTION, SOLUTION INTRAVENOUS at 19:03

## 2024-08-26 RX ADMIN — Medication 140 MG: at 19:03

## 2024-08-26 ASSESSMENT — PAIN - FUNCTIONAL ASSESSMENT: PAIN_FUNCTIONAL_ASSESSMENT: NONE - DENIES PAIN

## 2024-08-26 ASSESSMENT — PAIN SCALES - GENERAL: PAINLEVEL_OUTOF10: 0

## 2024-08-26 NOTE — ED NOTES
Patient log rolled with with c-spine precautions maintained. No step offs, deformites, or signs of trauma.
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
## 2024-08-26 NOTE — ED NOTES
Jason Hart, some jaundice on exam and 8% down from birthweight. Still well below treatment threshold. Scheduled follow-up check with you for Monday. Let me know if any questions.  Thanks! [facility] Trauma Team Called At:[phone]
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
## 2024-08-27 VITALS
SYSTOLIC BLOOD PRESSURE: 131 MMHG | HEIGHT: 66 IN | OXYGEN SATURATION: 98 % | HEART RATE: 68 BPM | BODY MASS INDEX: 27 KG/M2 | WEIGHT: 168 LBS | RESPIRATION RATE: 17 BRPM | TEMPERATURE: 98.5 F | DIASTOLIC BLOOD PRESSURE: 73 MMHG

## 2024-08-27 LAB
ALBUMIN SERPL-MCNC: 3.6 G/DL (ref 3.5–5.2)
ALP SERPL-CCNC: 79 U/L (ref 40–129)
ALT SERPL-CCNC: 24 U/L (ref 0–40)
ANION GAP SERPL CALCULATED.3IONS-SCNC: 10 MMOL/L (ref 7–16)
AST SERPL-CCNC: 39 U/L (ref 0–39)
BASOPHILS # BLD: 0.07 K/UL (ref 0–0.2)
BASOPHILS NFR BLD: 1 % (ref 0–2)
BILIRUB SERPL-MCNC: 0.7 MG/DL (ref 0–1.2)
BUN SERPL-MCNC: 23 MG/DL (ref 6–23)
CALCIUM SERPL-MCNC: 8.2 MG/DL (ref 8.6–10.2)
CHLORIDE SERPL-SCNC: 109 MMOL/L (ref 98–107)
CO2 SERPL-SCNC: 20 MMOL/L (ref 22–29)
CREAT SERPL-MCNC: 1.1 MG/DL (ref 0.7–1.2)
EOSINOPHIL # BLD: 0.04 K/UL (ref 0.05–0.5)
EOSINOPHILS RELATIVE PERCENT: 0 % (ref 0–6)
ERYTHROCYTE [DISTWIDTH] IN BLOOD BY AUTOMATED COUNT: 14.7 % (ref 11.5–15)
GFR, ESTIMATED: 76 ML/MIN/1.73M2
GLUCOSE SERPL-MCNC: 114 MG/DL (ref 74–99)
HCT VFR BLD AUTO: 35.2 % (ref 37–54)
HGB BLD-MCNC: 10.7 G/DL (ref 12.5–16.5)
IMM GRANULOCYTES # BLD AUTO: 0.05 K/UL (ref 0–0.58)
IMM GRANULOCYTES NFR BLD: 0 % (ref 0–5)
LACTATE BLDV-SCNC: 1.1 MMOL/L (ref 0.5–2.2)
LYMPHOCYTES NFR BLD: 1.74 K/UL (ref 1.5–4)
LYMPHOCYTES RELATIVE PERCENT: 14 % (ref 20–42)
MCH RBC QN AUTO: 27.6 PG (ref 26–35)
MCHC RBC AUTO-ENTMCNC: 30.4 G/DL (ref 32–34.5)
MCV RBC AUTO: 90.7 FL (ref 80–99.9)
MONOCYTES NFR BLD: 0.96 K/UL (ref 0.1–0.95)
MONOCYTES NFR BLD: 8 % (ref 2–12)
NEUTROPHILS NFR BLD: 77 % (ref 43–80)
NEUTS SEG NFR BLD: 9.66 K/UL (ref 1.8–7.3)
PLATELET # BLD AUTO: 200 K/UL (ref 130–450)
PMV BLD AUTO: 11.2 FL (ref 7–12)
POTASSIUM SERPL-SCNC: 4.6 MMOL/L (ref 3.5–5)
PROT SERPL-MCNC: 5.8 G/DL (ref 6.4–8.3)
RBC # BLD AUTO: 3.88 M/UL (ref 3.8–5.8)
SODIUM SERPL-SCNC: 139 MMOL/L (ref 132–146)
WBC OTHER # BLD: 12.5 K/UL (ref 4.5–11.5)

## 2024-08-27 PROCEDURE — 97530 THERAPEUTIC ACTIVITIES: CPT

## 2024-08-27 PROCEDURE — 36415 COLL VENOUS BLD VENIPUNCTURE: CPT

## 2024-08-27 PROCEDURE — 80053 COMPREHEN METABOLIC PANEL: CPT

## 2024-08-27 PROCEDURE — 97161 PT EVAL LOW COMPLEX 20 MIN: CPT

## 2024-08-27 PROCEDURE — 99024 POSTOP FOLLOW-UP VISIT: CPT | Performed by: SURGERY

## 2024-08-27 PROCEDURE — 85025 COMPLETE CBC W/AUTO DIFF WBC: CPT

## 2024-08-27 PROCEDURE — 97535 SELF CARE MNGMENT TRAINING: CPT

## 2024-08-27 PROCEDURE — 97165 OT EVAL LOW COMPLEX 30 MIN: CPT

## 2024-08-27 PROCEDURE — 83605 ASSAY OF LACTIC ACID: CPT

## 2024-08-27 RX ORDER — OXYCODONE HYDROCHLORIDE 5 MG/1
5 TABLET ORAL EVERY 6 HOURS PRN
Qty: 28 TABLET | Refills: 0 | Status: SHIPPED | OUTPATIENT
Start: 2024-08-27 | End: 2024-09-03

## 2024-08-27 RX ORDER — POLYETHYLENE GLYCOL 3350 17 G/17G
17 POWDER, FOR SOLUTION ORAL DAILY
Qty: 30 PACKET | Refills: 0 | Status: SHIPPED | OUTPATIENT
Start: 2024-08-27 | End: 2024-09-26

## 2024-08-27 RX ORDER — METHOCARBAMOL 750 MG/1
750 TABLET, FILM COATED ORAL 4 TIMES DAILY
Qty: 40 TABLET | Refills: 0 | Status: SHIPPED | OUTPATIENT
Start: 2024-08-27 | End: 2024-09-06

## 2024-08-27 ASSESSMENT — PAIN SCALES - GENERAL: PAINLEVEL_OUTOF10: 0
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
## 2024-08-27 NOTE — DISCHARGE INSTRUCTIONS
TRAUMA SERVICES DISCHARGE INSTRUCTIONS    Call [phone], option 2, for any questions/concerns and for follow-up appointment in 1 week(s).    Please follow the instructions checked below:    Please follow-up with your primary care provider.    ACTIVITY INSTRUCTIONS  Increase activity as tolerated  No heavy lifting or strenuous activity  Take your incentive spirometer home and use 4-6 times/day      WOUND/DRESSING INSTRUCTIONS:  You may shower.  No sitting in bath tub, hot tub or swimming until cleared by physician.  Ice to areas of pain for first 24 hours.  Heat to areas of pain after that.  Wash areas of lacerations/abrasions with soap & water.  Rinse well.  Pat dry with clean towel.  Apply thin layer of Bacitracin, Neosporin, or triple antibiotic cream to affected area 2-3 times per day.  Keep wounds clean and dry.   [x]  Sutures/Staples are to be removed in 8- 10  day(s).    MEDICATION INSTRUCTIONS  Take medication as prescribed.  When taking pain medications, you may experience dizziness or drowsiness.  Do not drink alcohol or drive when taking these medications.  You may experience constipation while taking pain medication.  You may take over the counter stool softeners such as docusate (Colace), sennosides S (Senokot-S), or Miralax.   []  You may take Ibuprofen (over the counter) as directed for mild pain.     --You may take up to 800mg every 8 hours for pain, please take with food or milk.   [x]  You may take acetaminophen (Tylenol) products.  Do NOT take more than 4000mg of Tylenol in 24h.   []  Do not take any other acetaminophen (Tylenol) products if you are taking Percocet or Norco, as these contain Tylenol.   --Do NOT take more than 4000mg of Tylenol in 24h.    OPIOID MEDICATION INSTRUCTIONS  Read the medication guide that is included with your prescription.  Take your medication exactly as prescribed.  Store medication away from children and in a safe place.  Do NOT share your medication with  2  [facility]  [address] [place]  [address]

## 2024-08-27 NOTE — PLAN OF CARE
Problem: Discharge Planning  Goal: Discharge to home or other facility with appropriate resources  8/27/2024 0731 by Lisa Aaron, RN  Outcome: Progressing     Problem: Pain  Goal: Verbalizes/displays adequate comfort level or baseline comfort level  8/27/2024 0731 by Lisa Aaron, RN  Outcome: Progressing     Problem: ABCDS Injury Assessment  Goal: Absence of physical injury  Outcome: Progressing
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
## 2024-08-27 NOTE — PATIENT CARE CONFERENCE
P Quality Flow/Interdisciplinary Rounds Progress Note        Quality Flow Rounds held on August 27, 2024    Disciplines Attending:  Bedside Nurse, , , and Nursing Unit Leadership    Mani Williamson was admitted on 8/26/2024  6:52 PM    Anticipated Discharge Date:       Disposition:    Kendall Score:  Kendall Scale Score: 19    Readmission Risk              Risk of Unplanned Readmission:  8           Discussed patient goal for the day, patient clinical progression, and barriers to discharge.  The following Goal(s) of the Day/Commitment(s) have been identified:  Report labs/diagnostics       Carmle Martinez RN  August 27, 2024
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
## 2024-08-27 NOTE — PROGRESS NOTES
4 Eyes Skin Assessment     NAME:  Mani Williamson  YOB: 1955  MEDICAL RECORD NUMBER:  [id number]    The patient is being assessed for  Admission    I agree that at least one RN has performed a thorough Head to Toe Skin Assessment on the patient. ALL assessment sites listed below have been assessed.      Areas assessed by both nurses:    Head, Face, Ears, Shoulders, Back, Chest, Arms, Elbows, Hands, Sacrum. Buttock, Coccyx, Ischium, Legs. Feet and Heels, and Under Medical Devices         Does the Patient have a Wound? Yes wound(s) were present on assessment. LDA wound assessment was Initiated and completed by RN       Kendall Prevention initiated by RN: No  Wound Care Orders initiated by RN: No    Pressure Injury (Stage 3,4, Unstageable, DTI, NWPT, and Complex wounds) if present, place Wound referral order by RN under : No    New Ostomies, if present place, Ostomy referral order under : No     Nurse 1 eSignature: Electronically signed by Fátima Baptiste RN on 8/27/24 at 1:23 AM EDT    **SHARE this note so that the co-signing nurse can place an eSignature**    Nurse 2 eSignature: {Esignature:[id number]}

## 2024-08-27 NOTE — PROGRESS NOTES
CLINICAL PHARMACY NOTE: MEDS TO BEDS    Total # of Prescriptions Filled: 0   The following medications were delivered to the patient:      Additional Documentation:     Patient did not want any of his meds. Notified his nurse.
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
## 2024-08-30 ENCOUNTER — OFFICE VISIT (OUTPATIENT)
Dept: SURGERY | Age: 69
End: 2024-08-30
Payer: COMMERCIAL

## 2024-08-30 ENCOUNTER — TELEPHONE (OUTPATIENT)
Dept: SURGERY | Age: 69
End: 2024-08-30

## 2024-08-30 VITALS
HEART RATE: 110 BPM | SYSTOLIC BLOOD PRESSURE: 150 MMHG | WEIGHT: 168 LBS | BODY MASS INDEX: 27.12 KG/M2 | OXYGEN SATURATION: 99 % | DIASTOLIC BLOOD PRESSURE: 76 MMHG | RESPIRATION RATE: 16 BRPM | TEMPERATURE: 98 F

## 2024-08-30 DIAGNOSIS — S81.811D: Primary | ICD-10-CM

## 2024-08-30 PROCEDURE — 99212 OFFICE O/P EST SF 10 MIN: CPT
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
## 2024-09-01 ENCOUNTER — HOSPITAL ENCOUNTER (EMERGENCY)
Age: 69
Discharge: HOME | End: 2024-09-01
Attending: EMERGENCY MEDICINE
Payer: COMMERCIAL

## 2024-09-01 ENCOUNTER — APPOINTMENT (OUTPATIENT)
Dept: CT IMAGING | Age: 69
End: 2024-09-01
Payer: COMMERCIAL

## 2024-09-01 VITALS
DIASTOLIC BLOOD PRESSURE: 57 MMHG | RESPIRATION RATE: 18 BRPM | OXYGEN SATURATION: 98 % | SYSTOLIC BLOOD PRESSURE: 128 MMHG | TEMPERATURE: 97.4 F | HEART RATE: 83 BPM

## 2024-09-01 DIAGNOSIS — S81.801D WOUND OF RIGHT LOWER EXTREMITY, SUBSEQUENT ENCOUNTER: Primary | ICD-10-CM

## 2024-09-01 LAB
ALBUMIN SERPL-MCNC: 4 G/DL (ref 3.5–5.2)
ALP SERPL-CCNC: 97 U/L (ref 40–129)
ALT SERPL-CCNC: 23 U/L (ref 0–40)
ANION GAP SERPL CALCULATED.3IONS-SCNC: 11 MMOL/L (ref 7–16)
AST SERPL-CCNC: 24 U/L (ref 0–39)
BASOPHILS # BLD: 0.11 K/UL (ref 0–0.2)
BASOPHILS NFR BLD: 1 % (ref 0–2)
BILIRUB SERPL-MCNC: 0.6 MG/DL (ref 0–1.2)
BUN SERPL-MCNC: 24 MG/DL (ref 6–23)
CALCIUM SERPL-MCNC: 8.9 MG/DL (ref 8.6–10.2)
CHLORIDE SERPL-SCNC: 105 MMOL/L (ref 98–107)
CO2 SERPL-SCNC: 23 MMOL/L (ref 22–29)
CREAT SERPL-MCNC: 1.1 MG/DL (ref 0.7–1.2)
CRP SERPL HS-MCNC: 10 MG/L (ref 0–5)
EOSINOPHIL # BLD: 0.28 K/UL (ref 0.05–0.5)
EOSINOPHILS RELATIVE PERCENT: 3 % (ref 0–6)
ERYTHROCYTE [DISTWIDTH] IN BLOOD BY AUTOMATED COUNT: 14.6 % (ref 11.5–15)
ERYTHROCYTE [SEDIMENTATION RATE] IN BLOOD BY WESTERGREN METHOD: 14 MM/HR (ref 0–15)
GFR, ESTIMATED: 71 ML/MIN/1.73M2
GLUCOSE SERPL-MCNC: 88 MG/DL (ref 74–99)
HCT VFR BLD AUTO: 37.6 % (ref 37–54)
HGB BLD-MCNC: 12.3 G/DL (ref 12.5–16.5)
IMM GRANULOCYTES # BLD AUTO: 0.03 K/UL (ref 0–0.58)
IMM GRANULOCYTES NFR BLD: 0 % (ref 0–5)
LACTATE BLDV-SCNC: 1 MMOL/L (ref 0.5–2.2)
LYMPHOCYTES NFR BLD: 2.08 K/UL (ref 1.5–4)
LYMPHOCYTES RELATIVE PERCENT: 25 % (ref 20–42)
MCH RBC QN AUTO: 27.7 PG (ref 26–35)
MCHC RBC AUTO-ENTMCNC: 32.7 G/DL (ref 32–34.5)
MCV RBC AUTO: 84.7 FL (ref 80–99.9)
MONOCYTES NFR BLD: 0.89 K/UL (ref 0.1–0.95)
MONOCYTES NFR BLD: 11 % (ref 2–12)
NEUTROPHILS NFR BLD: 60 % (ref 43–80)
NEUTS SEG NFR BLD: 4.97 K/UL (ref 1.8–7.3)
PLATELET # BLD AUTO: 325 K/UL (ref 130–450)
PMV BLD AUTO: 10.3 FL (ref 7–12)
POTASSIUM SERPL-SCNC: 5.2 MMOL/L (ref 3.5–5)
PROT SERPL-MCNC: 6.7 G/DL (ref 6.4–8.3)
RBC # BLD AUTO: 4.44 M/UL (ref 3.8–5.8)
SODIUM SERPL-SCNC: 139 MMOL/L (ref 132–146)
WBC OTHER # BLD: 8.4 K/UL (ref 4.5–11.5)

## 2024-09-01 PROCEDURE — 6360000002 HC RX W HCPCS: Performed by: EMERGENCY MEDICINE

## 2024-09-01 PROCEDURE — 85652 RBC SED RATE AUTOMATED: CPT

## 2024-09-01 PROCEDURE — 80053 COMPREHEN METABOLIC PANEL: CPT

## 2024-09-01 PROCEDURE — 96365 THER/PROPH/DIAG IV INF INIT: CPT

## 2024-09-01 PROCEDURE — 73700 CT LOWER EXTREMITY W/O DYE: CPT

## 2024-09-01 PROCEDURE — 87077 CULTURE AEROBIC IDENTIFY: CPT

## 2024-09-01 PROCEDURE — 2580000003 HC RX 258: Performed by: EMERGENCY MEDICINE

## 2024-09-01 PROCEDURE — 87040 BLOOD CULTURE FOR BACTERIA: CPT

## 2024-09-01 PROCEDURE — 85025 COMPLETE CBC W/AUTO DIFF WBC: CPT

## 2024-09-01 PROCEDURE — 86140 C-REACTIVE PROTEIN: CPT

## 2024-09-01 PROCEDURE — 96366 THER/PROPH/DIAG IV INF ADDON: CPT

## 2024-09-01 PROCEDURE — 87205 SMEAR GRAM STAIN: CPT

## 2024-09-01 PROCEDURE — 6360000002 HC RX W HCPCS

## 2024-09-01 PROCEDURE — 96367 TX/PROPH/DG ADDL SEQ IV INF: CPT

## 2024-09-01 PROCEDURE — 99284 EMERGENCY DEPT VISIT MOD MDM: CPT

## 2024-09-01 PROCEDURE — 83605 ASSAY OF LACTIC ACID: CPT

## 2024-09-01 PROCEDURE — 87070 CULTURE OTHR SPECIMN AEROBIC: CPT

## 2024-09-01 RX ORDER — VANCOMYCIN HYDROCHLORIDE 500 MG/10ML
INJECTION, POWDER, LYOPHILIZED, FOR SOLUTION INTRAVENOUS
Status: COMPLETED
Start: 2024-09-01 | End: 2024-09-01

## 2024-09-01 RX ORDER — DOXYCYCLINE HYCLATE 100 MG
100 TABLET ORAL 2 TIMES DAILY
Qty: 20 TABLET | Refills: 0 | Status: SHIPPED | OUTPATIENT
Start: 2024-09-01 | End: 2024-09-01

## 2024-09-01 RX ORDER — DOXYCYCLINE HYCLATE 100 MG
100 TABLET ORAL 2 TIMES DAILY
Qty: 20 TABLET | Refills: 0 | Status: SHIPPED | OUTPATIENT
Start: 2024-09-01 | End: 2024-09-11

## 2024-09-01 RX ADMIN — PIPERACILLIN AND TAZOBACTAM 3375 MG: 3; .375 INJECTION, POWDER, LYOPHILIZED, FOR SOLUTION INTRAVENOUS at 13:46

## 2024-09-01 RX ADMIN — VANCOMYCIN HYDROCHLORIDE 1250 MG: 1 INJECTION, POWDER, LYOPHILIZED, FOR SOLUTION INTRAVENOUS at 14:32

## 2024-09-01 RX ADMIN — VANCOMYCIN HYDROCHLORIDE: 500 INJECTION, POWDER, LYOPHILIZED, FOR SOLUTION INTRAVENOUS at 14:32

## 2024-09-01 ASSESSMENT — LIFESTYLE VARIABLES
HOW OFTEN DO YOU HAVE A DRINK CONTAINING ALCOHOL: NEVER
HOW MANY STANDARD DRINKS CONTAINING ALCOHOL DO YOU HAVE ON A TYPICAL DAY: PATIENT DOES NOT DRINK

## 2024-09-01 ASSESSMENT — PAIN DESCRIPTION - DESCRIPTORS: DESCRIPTORS: ACHING;SORE;TENDER;THROBBING;TIGHTNESS

## 2024-09-01 ASSESSMENT — PAIN DESCRIPTION - LOCATION: LOCATION: LEG

## 2024-09-01 ASSESSMENT — PAIN DESCRIPTION - FREQUENCY: FREQUENCY: CONTINUOUS

## 2024-09-01 ASSESSMENT — PAIN - FUNCTIONAL ASSESSMENT: PAIN_FUNCTIONAL_ASSESSMENT: 0-10

## 2024-09-01 ASSESSMENT — PAIN SCALES - GENERAL: PAINLEVEL_OUTOF10: 10

## 2024-09-01 ASSESSMENT — PAIN DESCRIPTION - ORIENTATION: ORIENTATION: RIGHT;LOWER

## 2024-09-01 ASSESSMENT — PAIN DESCRIPTION - ONSET: ONSET: ON-GOING

## 2024-09-01 ASSESSMENT — PAIN DESCRIPTION - PAIN TYPE: TYPE: ACUTE PAIN
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
## 2024-09-03 ENCOUNTER — TELEPHONE (OUTPATIENT)
Dept: SURGERY | Age: 69
End: 2024-09-03

## 2024-09-03 NOTE — TELEPHONE ENCOUNTER
ANGLE called and spoke with emergency contact, Corrine, to schedule ER follow up for RLE wound check. Per patient spouse, she will call office back regarding scheduling appointment tomorrow with Dr Garvin, due to possible scheduling conflict.     Awaiting return call.     Electronically signed by Harjinder Grey LPN on 9/3/24 at 9:26 AM EDT

## 2024-09-04 ENCOUNTER — OFFICE VISIT (OUTPATIENT)
Dept: SURGERY | Age: 69
End: 2024-09-04

## 2024-09-04 VITALS
BODY MASS INDEX: 27 KG/M2 | HEIGHT: 66 IN | SYSTOLIC BLOOD PRESSURE: 142 MMHG | HEART RATE: 87 BPM | RESPIRATION RATE: 16 BRPM | OXYGEN SATURATION: 98 % | DIASTOLIC BLOOD PRESSURE: 65 MMHG | WEIGHT: 168 LBS | TEMPERATURE: 98.4 F

## 2024-09-04 DIAGNOSIS — S81.811D LACERATION OF RIGHT LOWER EXTREMITY, SUBSEQUENT ENCOUNTER: Primary | ICD-10-CM

## 2024-09-04 LAB
MICROORGANISM SPEC CULT: ABNORMAL
MICROORGANISM/AGENT SPEC: ABNORMAL
SPECIMEN DESCRIPTION: ABNORMAL

## 2024-09-04 PROCEDURE — 99024 POSTOP FOLLOW-UP VISIT: CPT | Performed by: STUDENT IN AN ORGANIZED HEALTH CARE EDUCATION/TRAINING PROGRAM
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
## 2024-09-06 LAB
MICROORGANISM SPEC CULT: NORMAL
MICROORGANISM SPEC CULT: NORMAL
SERVICE CMNT-IMP: NORMAL
SERVICE CMNT-IMP: NORMAL
SPECIMEN DESCRIPTION: NORMAL
SPECIMEN DESCRIPTION: NORMAL

## 2024-09-09 ENCOUNTER — TELEPHONE (OUTPATIENT)
Dept: SURGERY | Age: 69
End: 2024-09-09

## 2024-09-11 ENCOUNTER — OFFICE VISIT (OUTPATIENT)
Dept: SURGERY | Age: 69
End: 2024-09-11
Payer: COMMERCIAL

## 2024-09-11 VITALS
WEIGHT: 168 LBS | DIASTOLIC BLOOD PRESSURE: 63 MMHG | BODY MASS INDEX: 27.12 KG/M2 | RESPIRATION RATE: 16 BRPM | OXYGEN SATURATION: 100 % | SYSTOLIC BLOOD PRESSURE: 120 MMHG | TEMPERATURE: 97.2 F | HEART RATE: 83 BPM

## 2024-09-11 DIAGNOSIS — M79.89 LEG SWELLING: Primary | ICD-10-CM

## 2024-09-11 PROCEDURE — 99213 OFFICE O/P EST LOW 20 MIN: CPT | Performed by: STUDENT IN AN ORGANIZED HEALTH CARE EDUCATION/TRAINING PROGRAM

## 2024-09-11 PROCEDURE — 99212 OFFICE O/P EST SF 10 MIN: CPT

## 2024-09-11 PROCEDURE — 1123F ACP DISCUSS/DSCN MKR DOCD: CPT | Performed by: STUDENT IN AN ORGANIZED HEALTH CARE EDUCATION/TRAINING PROGRAM

## 2024-09-18 ENCOUNTER — OFFICE VISIT (OUTPATIENT)
Dept: SURGERY | Age: 69
End: 2024-09-18
Payer: COMMERCIAL

## 2024-09-18 VITALS
DIASTOLIC BLOOD PRESSURE: 60 MMHG | OXYGEN SATURATION: 98 % | SYSTOLIC BLOOD PRESSURE: 122 MMHG | BODY MASS INDEX: 27.12 KG/M2 | HEART RATE: 104 BPM | RESPIRATION RATE: 16 BRPM | HEIGHT: 66 IN | TEMPERATURE: 97.3 F

## 2024-09-18 DIAGNOSIS — S81.811A: Primary | ICD-10-CM

## 2024-09-18 PROCEDURE — 99213 OFFICE O/P EST LOW 20 MIN: CPT | Performed by: STUDENT IN AN ORGANIZED HEALTH CARE EDUCATION/TRAINING PROGRAM

## 2024-09-18 PROCEDURE — 99212 OFFICE O/P EST SF 10 MIN: CPT | Performed by: STUDENT IN AN ORGANIZED HEALTH CARE EDUCATION/TRAINING PROGRAM

## 2024-09-18 PROCEDURE — 1123F ACP DISCUSS/DSCN MKR DOCD: CPT | Performed by: STUDENT IN AN ORGANIZED HEALTH CARE EDUCATION/TRAINING PROGRAM

## 2024-09-25 ENCOUNTER — TELEPHONE (OUTPATIENT)
Dept: SURGERY | Age: 69
End: 2024-09-25

## 2024-10-12 ENCOUNTER — HOSPITAL ENCOUNTER (OUTPATIENT)
Age: 69
Discharge: HOME | End: 2024-10-12
Payer: COMMERCIAL

## 2024-10-12 LAB
BASOPHILS # BLD: 0.13 K/UL (ref 0–0.2)
BASOPHILS NFR BLD: 2 % (ref 0–2)
EOSINOPHIL # BLD: 0.17 K/UL (ref 0.05–0.5)
EOSINOPHILS RELATIVE PERCENT: 3 % (ref 0–6)
ERYTHROCYTE [DISTWIDTH] IN BLOOD BY AUTOMATED COUNT: 15.3 % (ref 11.5–15)
HCT VFR BLD AUTO: 42.5 % (ref 37–54)
HGB BLD-MCNC: 12.6 G/DL (ref 12.5–16.5)
IMM GRANULOCYTES # BLD AUTO: <0.03 K/UL (ref 0–0.58)
IMM GRANULOCYTES NFR BLD: 0 % (ref 0–5)
LYMPHOCYTES NFR BLD: 1.41 K/UL (ref 1.5–4)
LYMPHOCYTES RELATIVE PERCENT: 23 % (ref 20–42)
MCH RBC QN AUTO: 24.5 PG (ref 26–35)
MCHC RBC AUTO-ENTMCNC: 29.6 G/DL (ref 32–34.5)
MCV RBC AUTO: 82.5 FL (ref 80–99.9)
MISCELLANEOUS LAB TEST RESULT: NORMAL
MISCELLANEOUS LAB TEST RESULT: NORMAL
MONOCYTES NFR BLD: 0.58 K/UL (ref 0.1–0.95)
MONOCYTES NFR BLD: 10 % (ref 2–12)
NEUTROPHILS NFR BLD: 62 % (ref 43–80)
NEUTS SEG NFR BLD: 3.76 K/UL (ref 1.8–7.3)
PLATELET # BLD AUTO: 329 K/UL (ref 130–450)
PMV BLD AUTO: 9.9 FL (ref 7–12)
PSA SERPL-MCNC: 1.14 NG/ML (ref 0–4)
RBC # BLD AUTO: 5.15 M/UL (ref 3.8–5.8)
TEST NAME: NORMAL
TEST NAME: NORMAL
WBC OTHER # BLD: 6.1 K/UL (ref 4.5–11.5)

## 2024-10-12 PROCEDURE — 84153 ASSAY OF PSA TOTAL: CPT

## 2024-10-12 PROCEDURE — 85025 COMPLETE CBC W/AUTO DIFF WBC: CPT

## 2024-10-12 PROCEDURE — 36415 COLL VENOUS BLD VENIPUNCTURE: CPT

## 2024-10-17 LAB
MISCELLANEOUS LAB TEST RESULT: ABNORMAL
TEST NAME: ABNORMAL

## 2024-10-20 LAB
MISCELLANEOUS LAB TEST RESULT: NORMAL
TEST NAME: NORMAL

## 2024-10-30 ENCOUNTER — HOSPITAL ENCOUNTER (EMERGENCY)
Age: 69
Discharge: HOME | DRG: 309 | End: 2024-10-30
Payer: COMMERCIAL

## 2024-10-30 ENCOUNTER — APPOINTMENT (OUTPATIENT)
Dept: CT IMAGING | Age: 69
DRG: 309 | End: 2024-10-30
Payer: COMMERCIAL

## 2024-10-30 ENCOUNTER — APPOINTMENT (OUTPATIENT)
Dept: ULTRASOUND IMAGING | Age: 69
DRG: 309 | End: 2024-10-30
Payer: COMMERCIAL

## 2024-10-30 ENCOUNTER — HOSPITAL ENCOUNTER (INPATIENT)
Age: 69
LOS: 1 days | Discharge: HOME | DRG: 309 | End: 2024-10-31
Attending: EMERGENCY MEDICINE | Admitting: INTERNAL MEDICINE
Payer: COMMERCIAL

## 2024-10-30 VITALS
WEIGHT: 168 LBS | OXYGEN SATURATION: 97 % | SYSTOLIC BLOOD PRESSURE: 146 MMHG | HEART RATE: 74 BPM | DIASTOLIC BLOOD PRESSURE: 88 MMHG | BODY MASS INDEX: 27.12 KG/M2 | RESPIRATION RATE: 20 BRPM | TEMPERATURE: 98.1 F

## 2024-10-30 DIAGNOSIS — R06.00 DYSPNEA, UNSPECIFIED TYPE: Primary | ICD-10-CM

## 2024-10-30 DIAGNOSIS — I48.0 PAROXYSMAL ATRIAL FIBRILLATION (HCC): ICD-10-CM

## 2024-10-30 DIAGNOSIS — I48.92 NEW ONSET ATRIAL FLUTTER (HCC): Primary | ICD-10-CM

## 2024-10-30 DIAGNOSIS — I50.20 SYSTOLIC CONGESTIVE HEART FAILURE, UNSPECIFIED HF CHRONICITY (HCC): ICD-10-CM

## 2024-10-30 LAB
ALBUMIN SERPL-MCNC: 4.2 G/DL (ref 3.5–5.2)
ALP SERPL-CCNC: 109 U/L (ref 40–129)
ALT SERPL-CCNC: 28 U/L (ref 0–40)
ANION GAP SERPL CALCULATED.3IONS-SCNC: 10 MMOL/L (ref 7–16)
AST SERPL-CCNC: 26 U/L (ref 0–39)
BASOPHILS # BLD: 0.14 K/UL (ref 0–0.2)
BASOPHILS NFR BLD: 2 % (ref 0–2)
BILIRUB SERPL-MCNC: 0.5 MG/DL (ref 0–1.2)
BNP SERPL-MCNC: 356 PG/ML (ref 0–450)
BUN SERPL-MCNC: 24 MG/DL (ref 6–23)
CALCIUM SERPL-MCNC: 8.7 MG/DL (ref 8.6–10.2)
CHLORIDE SERPL-SCNC: 105 MMOL/L (ref 98–107)
CO2 SERPL-SCNC: 23 MMOL/L (ref 22–29)
CREAT SERPL-MCNC: 1 MG/DL (ref 0.7–1.2)
CRP SERPL HS-MCNC: <3 MG/L (ref 0–5)
EKG ATRIAL RATE: 296 BPM
EKG P AXIS: 104 DEGREES
EKG Q-T INTERVAL: 374 MS
EKG QRS DURATION: 90 MS
EKG QTC CALCULATION (BAZETT): 415 MS
EKG R AXIS: 45 DEGREES
EKG T AXIS: -4 DEGREES
EKG VENTRICULAR RATE: 74 BPM
EOSINOPHIL # BLD: 0.14 K/UL (ref 0.05–0.5)
EOSINOPHILS RELATIVE PERCENT: 2 % (ref 0–6)
ERYTHROCYTE [DISTWIDTH] IN BLOOD BY AUTOMATED COUNT: 16.3 % (ref 11.5–15)
GFR, ESTIMATED: 80 ML/MIN/1.73M2
GLUCOSE SERPL-MCNC: 103 MG/DL (ref 74–99)
HCT VFR BLD AUTO: 42.2 % (ref 37–54)
HGB BLD-MCNC: 12.7 G/DL (ref 12.5–16.5)
IMM GRANULOCYTES # BLD AUTO: 0.03 K/UL (ref 0–0.58)
IMM GRANULOCYTES NFR BLD: 0 % (ref 0–5)
LYMPHOCYTES NFR BLD: 2 K/UL (ref 1.5–4)
LYMPHOCYTES RELATIVE PERCENT: 29 % (ref 20–42)
MAGNESIUM SERPL-MCNC: 2.1 MG/DL (ref 1.6–2.6)
MCH RBC QN AUTO: 23.4 PG (ref 26–35)
MCHC RBC AUTO-ENTMCNC: 30.1 G/DL (ref 32–34.5)
MCV RBC AUTO: 77.7 FL (ref 80–99.9)
MONOCYTES NFR BLD: 0.59 K/UL (ref 0.1–0.95)
MONOCYTES NFR BLD: 9 % (ref 2–12)
NEUTROPHILS NFR BLD: 58 % (ref 43–80)
NEUTS SEG NFR BLD: 4.05 K/UL (ref 1.8–7.3)
PLATELET # BLD AUTO: 336 K/UL (ref 130–450)
PMV BLD AUTO: 9.9 FL (ref 7–12)
POTASSIUM SERPL-SCNC: 4.5 MMOL/L (ref 3.5–5)
PROCALCITONIN SERPL-MCNC: 0.05 NG/ML (ref 0–0.08)
PROT SERPL-MCNC: 7.1 G/DL (ref 6.4–8.3)
RBC # BLD AUTO: 5.43 M/UL (ref 3.8–5.8)
SODIUM SERPL-SCNC: 138 MMOL/L (ref 132–146)
TROPONIN I SERPL HS-MCNC: 15 NG/L (ref 0–11)
TROPONIN I SERPL HS-MCNC: 15 NG/L (ref 0–11)
WBC OTHER # BLD: 7 K/UL (ref 4.5–11.5)

## 2024-10-30 PROCEDURE — 6360000004 HC RX CONTRAST MEDICATION: Performed by: RADIOLOGY

## 2024-10-30 PROCEDURE — 83880 ASSAY OF NATRIURETIC PEPTIDE: CPT

## 2024-10-30 PROCEDURE — 1200000000 HC SEMI PRIVATE

## 2024-10-30 PROCEDURE — 6360000002 HC RX W HCPCS: Performed by: INTERNAL MEDICINE

## 2024-10-30 PROCEDURE — 86140 C-REACTIVE PROTEIN: CPT

## 2024-10-30 PROCEDURE — 93005 ELECTROCARDIOGRAM TRACING: CPT | Performed by: EMERGENCY MEDICINE

## 2024-10-30 PROCEDURE — 84145 PROCALCITONIN (PCT): CPT

## 2024-10-30 PROCEDURE — 93010 ELECTROCARDIOGRAM REPORT: CPT | Performed by: INTERNAL MEDICINE

## 2024-10-30 PROCEDURE — 99285 EMERGENCY DEPT VISIT HI MDM: CPT

## 2024-10-30 PROCEDURE — 85025 COMPLETE CBC W/AUTO DIFF WBC: CPT

## 2024-10-30 PROCEDURE — 96374 THER/PROPH/DIAG INJ IV PUSH: CPT

## 2024-10-30 PROCEDURE — 6370000000 HC RX 637 (ALT 250 FOR IP): Performed by: INTERNAL MEDICINE

## 2024-10-30 PROCEDURE — 93005 ELECTROCARDIOGRAM TRACING: CPT

## 2024-10-30 PROCEDURE — 84484 ASSAY OF TROPONIN QUANT: CPT

## 2024-10-30 PROCEDURE — 96376 TX/PRO/DX INJ SAME DRUG ADON: CPT

## 2024-10-30 PROCEDURE — 83735 ASSAY OF MAGNESIUM: CPT

## 2024-10-30 PROCEDURE — G0378 HOSPITAL OBSERVATION PER HR: HCPCS

## 2024-10-30 PROCEDURE — 71275 CT ANGIOGRAPHY CHEST: CPT

## 2024-10-30 PROCEDURE — 93971 EXTREMITY STUDY: CPT

## 2024-10-30 PROCEDURE — 80053 COMPREHEN METABOLIC PANEL: CPT

## 2024-10-30 PROCEDURE — 99211 OFF/OP EST MAY X REQ PHY/QHP: CPT

## 2024-10-30 PROCEDURE — 6360000002 HC RX W HCPCS

## 2024-10-30 RX ORDER — FUROSEMIDE 10 MG/ML
40 INJECTION INTRAMUSCULAR; INTRAVENOUS 2 TIMES DAILY
Status: DISCONTINUED | OUTPATIENT
Start: 2024-10-30 | End: 2024-10-31 | Stop reason: HOSPADM

## 2024-10-30 RX ORDER — ENOXAPARIN SODIUM 100 MG/ML
40 INJECTION SUBCUTANEOUS DAILY
Status: DISCONTINUED | OUTPATIENT
Start: 2024-10-30 | End: 2024-10-31 | Stop reason: HOSPADM

## 2024-10-30 RX ORDER — MECOBALAMIN 5000 MCG
5 TABLET,DISINTEGRATING ORAL NIGHTLY
Status: DISCONTINUED | OUTPATIENT
Start: 2024-10-30 | End: 2024-10-31 | Stop reason: HOSPADM

## 2024-10-30 RX ORDER — ACETAMINOPHEN 500 MG
500 TABLET ORAL EVERY 6 HOURS PRN
Status: DISCONTINUED | OUTPATIENT
Start: 2024-10-30 | End: 2024-10-31 | Stop reason: HOSPADM

## 2024-10-30 RX ORDER — POTASSIUM CHLORIDE 1500 MG/1
20 TABLET, EXTENDED RELEASE ORAL 2 TIMES DAILY WITH MEALS
Status: DISCONTINUED | OUTPATIENT
Start: 2024-10-30 | End: 2024-10-31 | Stop reason: HOSPADM

## 2024-10-30 RX ORDER — MAGNESIUM SULFATE IN WATER 40 MG/ML
2000 INJECTION, SOLUTION INTRAVENOUS PRN
Status: DISCONTINUED | OUTPATIENT
Start: 2024-10-30 | End: 2024-10-31 | Stop reason: HOSPADM

## 2024-10-30 RX ORDER — POTASSIUM CHLORIDE 7.45 MG/ML
10 INJECTION INTRAVENOUS PRN
Status: DISCONTINUED | OUTPATIENT
Start: 2024-10-30 | End: 2024-10-31 | Stop reason: HOSPADM

## 2024-10-30 RX ORDER — IOPAMIDOL 755 MG/ML
75 INJECTION, SOLUTION INTRAVASCULAR
Status: COMPLETED | OUTPATIENT
Start: 2024-10-30 | End: 2024-10-30

## 2024-10-30 RX ORDER — POLYETHYLENE GLYCOL 3350 17 G/17G
17 POWDER, FOR SOLUTION ORAL DAILY PRN
Status: DISCONTINUED | OUTPATIENT
Start: 2024-10-30 | End: 2024-10-31 | Stop reason: HOSPADM

## 2024-10-30 RX ORDER — PROCHLORPERAZINE EDISYLATE 5 MG/ML
10 INJECTION INTRAMUSCULAR; INTRAVENOUS EVERY 6 HOURS PRN
Status: DISCONTINUED | OUTPATIENT
Start: 2024-10-30 | End: 2024-10-31 | Stop reason: HOSPADM

## 2024-10-30 RX ORDER — POTASSIUM CHLORIDE 1500 MG/1
40 TABLET, EXTENDED RELEASE ORAL PRN
Status: DISCONTINUED | OUTPATIENT
Start: 2024-10-30 | End: 2024-10-31 | Stop reason: HOSPADM

## 2024-10-30 RX ORDER — FUROSEMIDE 10 MG/ML
40 INJECTION INTRAMUSCULAR; INTRAVENOUS ONCE
Status: COMPLETED | OUTPATIENT
Start: 2024-10-30 | End: 2024-10-30

## 2024-10-30 RX ADMIN — FUROSEMIDE 40 MG: 10 INJECTION, SOLUTION INTRAMUSCULAR; INTRAVENOUS at 15:49

## 2024-10-30 RX ADMIN — POTASSIUM CHLORIDE 20 MEQ: 1500 TABLET, EXTENDED RELEASE ORAL at 21:29

## 2024-10-30 RX ADMIN — Medication 5 MG: at 21:30

## 2024-10-30 RX ADMIN — FUROSEMIDE 40 MG: 10 INJECTION, SOLUTION INTRAMUSCULAR; INTRAVENOUS at 21:29

## 2024-10-30 RX ADMIN — IOPAMIDOL 75 ML: 755 INJECTION, SOLUTION INTRAVENOUS at 14:05

## 2024-10-30 ASSESSMENT — ENCOUNTER SYMPTOMS
RHINORRHEA: 0
VOMITING: 0
COUGH: 0
BACK PAIN: 0
ABDOMINAL PAIN: 0
PHOTOPHOBIA: 0
SHORTNESS OF BREATH: 1
SORE THROAT: 0
NAUSEA: 0
VOICE CHANGE: 0
TROUBLE SWALLOWING: 0
WHEEZING: 0
DIARRHEA: 0

## 2024-10-30 ASSESSMENT — PAIN - FUNCTIONAL ASSESSMENT
PAIN_FUNCTIONAL_ASSESSMENT: 0-10
PAIN_FUNCTIONAL_ASSESSMENT: NONE - DENIES PAIN

## 2024-10-30 ASSESSMENT — PAIN DESCRIPTION - PAIN TYPE: TYPE: ACUTE PAIN

## 2024-10-30 ASSESSMENT — PAIN SCALES - GENERAL: PAINLEVEL_OUTOF10: 7

## 2024-10-30 ASSESSMENT — PAIN DESCRIPTION - LOCATION: LOCATION: CHEST

## 2024-10-30 ASSESSMENT — PAIN DESCRIPTION - FREQUENCY: FREQUENCY: INTERMITTENT

## 2024-10-30 ASSESSMENT — PAIN DESCRIPTION - DESCRIPTORS: DESCRIPTORS: ACHING;OTHER (COMMENT);SORE

## 2024-10-30 NOTE — PLAN OF CARE
Problem: Pain  Goal: Able to cope with pain  Description: Able to cope with pain  Outcome: HH/HSPC Progressing

## 2024-10-30 NOTE — PROGRESS NOTES
4 Eyes Skin Assessment     NAME:  Mani Williamson  YOB: 1955  MEDICAL RECORD NUMBER:  [id number]    The patient is being assessed for  Admission    I agree that at least one RN has performed a thorough Head to Toe Skin Assessment on the patient. ALL assessment sites listed below have been assessed.      Areas assessed by both nurses:    Head, Face, Ears, Shoulders, Back, Chest, Arms, Elbows, Hands, Sacrum. Buttock, Coccyx, Ischium, and Legs. Feet and Heels        Does the Patient have a Wound? No noted wound(s)       Kendall Prevention initiated by RN: No  Wound Care Orders initiated by RN: No    Pressure Injury (Stage 3,4, Unstageable, DTI, NWPT, and Complex wounds) if present, place Wound referral order by RN under : No    New Ostomies, if present place, Ostomy referral order under : No     Nurse 1 eSignature: Electronically signed by Haydee Ochoa RN on 10/30/24 at 6:30 PM EDT    **SHARE this note so that the co-signing nurse can place an eSignature**    Nurse 2 eSignature: Electronically signed by Angeline Mcintyre RN on 10/30/24 at 6:31 PM EDT
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
## 2024-10-31 ENCOUNTER — ANESTHESIA EVENT (OUTPATIENT)
Age: 69
End: 2024-10-31
Payer: COMMERCIAL

## 2024-10-31 ENCOUNTER — ANESTHESIA (OUTPATIENT)
Age: 69
End: 2024-10-31
Payer: COMMERCIAL

## 2024-10-31 ENCOUNTER — APPOINTMENT (OUTPATIENT)
Age: 69
DRG: 309 | End: 2024-10-31
Attending: INTERNAL MEDICINE
Payer: COMMERCIAL

## 2024-10-31 ENCOUNTER — APPOINTMENT (OUTPATIENT)
Age: 69
DRG: 309 | End: 2024-10-31
Payer: COMMERCIAL

## 2024-10-31 VITALS
BODY MASS INDEX: 26.37 KG/M2 | TEMPERATURE: 98 F | SYSTOLIC BLOOD PRESSURE: 120 MMHG | OXYGEN SATURATION: 99 % | RESPIRATION RATE: 13 BRPM | WEIGHT: 168 LBS | DIASTOLIC BLOOD PRESSURE: 82 MMHG | HEIGHT: 67 IN | HEART RATE: 78 BPM

## 2024-10-31 PROBLEM — I38 VHD (VALVULAR HEART DISEASE): Status: ACTIVE | Noted: 2024-10-31

## 2024-10-31 PROBLEM — I50.9 ACUTE DECOMPENSATED HEART FAILURE (HCC): Status: ACTIVE | Noted: 2024-10-31

## 2024-10-31 LAB
ALBUMIN SERPL-MCNC: 4.3 G/DL (ref 3.5–5.2)
ALP SERPL-CCNC: 112 U/L (ref 40–129)
ALT SERPL-CCNC: 29 U/L (ref 0–40)
ANION GAP SERPL CALCULATED.3IONS-SCNC: 13 MMOL/L (ref 7–16)
AST SERPL-CCNC: 30 U/L (ref 0–39)
BASOPHILS # BLD: 0.13 K/UL (ref 0–0.2)
BASOPHILS NFR BLD: 2 % (ref 0–2)
BILIRUB SERPL-MCNC: 0.8 MG/DL (ref 0–1.2)
BNP SERPL-MCNC: 261 PG/ML (ref 0–450)
BUN SERPL-MCNC: 26 MG/DL (ref 6–23)
CALCIUM SERPL-MCNC: 9.2 MG/DL (ref 8.6–10.2)
CHLORIDE SERPL-SCNC: 101 MMOL/L (ref 98–107)
CHOLEST SERPL-MCNC: 191 MG/DL
CO2 SERPL-SCNC: 23 MMOL/L (ref 22–29)
CREAT SERPL-MCNC: 1.3 MG/DL (ref 0.7–1.2)
ECHO AO ASC DIAM: 2.2 CM
ECHO AO ASCENDING AORTA INDEX: 1.17 CM/M2
ECHO AR MAX VEL PISA: 2.3 M/S
ECHO AV AREA PEAK VELOCITY: 2.2 CM2
ECHO AV AREA PEAK VELOCITY: 2.5 CM2
ECHO AV AREA PEAK VELOCITY: 2.6 CM2
ECHO AV AREA PEAK VELOCITY: 2.8 CM2
ECHO AV AREA VTI: 2.3 CM2
ECHO AV AREA/BSA VTI: 1.2 CM2/M2
ECHO AV CUSP MM: 1.8 CM
ECHO AV MEAN GRADIENT: 4 MMHG
ECHO AV MEAN VELOCITY: 1 M/S
ECHO AV PEAK GRADIENT: 7 MMHG
ECHO AV PEAK GRADIENT: 8 MMHG
ECHO AV PEAK VELOCITY: 1.2 M/S
ECHO AV PEAK VELOCITY: 1.4 M/S
ECHO AV REGURGITANT PHT: 2091.8 MILLISECOND
ECHO AV VTI: 23.7 CM
ECHO BSA: 1.9 M2
ECHO BSA: 1.9 M2
ECHO EST RA PRESSURE: 3 MMHG
ECHO LA DIAMETER INDEX: 1.86 CM/M2
ECHO LA DIAMETER: 3.5 CM
ECHO LA VOL A-L A2C: 78 ML (ref 18–58)
ECHO LA VOL A-L A4C: 94 ML (ref 18–58)
ECHO LA VOL BP: 86 ML (ref 18–58)
ECHO LA VOL MOD A2C: 78 ML (ref 18–58)
ECHO LA VOL MOD A4C: 89 ML (ref 18–58)
ECHO LA VOL/BSA BIPLANE: 46 ML/M2 (ref 16–34)
ECHO LA VOLUME AREA LENGTH: 89 ML
ECHO LA VOLUME INDEX A-L A2C: 41 ML/M2 (ref 16–34)
ECHO LA VOLUME INDEX A-L A4C: 50 ML/M2 (ref 16–34)
ECHO LA VOLUME INDEX AREA LENGTH: 47 ML/M2 (ref 16–34)
ECHO LA VOLUME INDEX MOD A2C: 41 ML/M2 (ref 16–34)
ECHO LA VOLUME INDEX MOD A4C: 47 ML/M2 (ref 16–34)
ECHO LV EF PHYSICIAN: 50 %
ECHO LV FRACTIONAL SHORTENING: 35 % (ref 28–44)
ECHO LV INTERNAL DIMENSION DIASTOLE INDEX: 2.71 CM/M2
ECHO LV INTERNAL DIMENSION DIASTOLIC: 5.1 CM (ref 4.2–5.9)
ECHO LV INTERNAL DIMENSION SYSTOLIC INDEX: 1.76 CM/M2
ECHO LV INTERNAL DIMENSION SYSTOLIC: 3.3 CM
ECHO LV IVSD: 1.2 CM (ref 0.6–1)
ECHO LV IVSS: 1.7 CM
ECHO LV MASS 2D: 241.2 G (ref 88–224)
ECHO LV MASS INDEX 2D: 128.3 G/M2 (ref 49–115)
ECHO LV POSTERIOR WALL DIASTOLIC: 1.2 CM (ref 0.6–1)
ECHO LV POSTERIOR WALL SYSTOLIC: 1.8 CM
ECHO LV RELATIVE WALL THICKNESS RATIO: 0.47
ECHO LVOT AREA: 3.1 CM2
ECHO LVOT AV VTI INDEX: 0.73
ECHO LVOT DIAM: 2 CM
ECHO LVOT MEAN GRADIENT: 2 MMHG
ECHO LVOT PEAK GRADIENT: 5 MMHG
ECHO LVOT PEAK GRADIENT: 5 MMHG
ECHO LVOT PEAK VELOCITY: 1 M/S
ECHO LVOT PEAK VELOCITY: 1.1 M/S
ECHO LVOT STROKE VOLUME INDEX: 29.1 ML/M2
ECHO LVOT SV: 54.6 ML
ECHO LVOT VTI: 17.4 CM
ECHO MV AREA PHT: 4.2 CM2
ECHO MV AREA VTI: 1.9 CM2
ECHO MV LVOT VTI INDEX: 1.64
ECHO MV MAX VELOCITY: 1.3 M/S
ECHO MV MEAN GRADIENT: 2 MMHG
ECHO MV MEAN VELOCITY: 0.6 M/S
ECHO MV PEAK GRADIENT: 6 MMHG
ECHO MV PRESSURE HALF TIME (PHT): 51.8 MS
ECHO MV VTI: 28.5 CM
ECHO PV MAX VELOCITY: 1 M/S
ECHO PV MEAN GRADIENT: 2 MMHG
ECHO PV MEAN VELOCITY: 0.7 M/S
ECHO PV PEAK GRADIENT: 4 MMHG
ECHO PV VTI: 15.5 CM
ECHO PVEIN A DURATION: 376.8 MS
ECHO PVEIN A VELOCITY: 0.3 M/S
ECHO PVEIN PEAK D VELOCITY: 0.4 M/S
ECHO PVEIN PEAK S VELOCITY: 0.2 M/S
ECHO PVEIN S/D RATIO: 0.5
ECHO RIGHT VENTRICULAR SYSTOLIC PRESSURE (RVSP): 11 MMHG
ECHO RV INTERNAL DIMENSION: 2.8 CM
ECHO RV LONGITUDINAL DIMENSION: 6.1 CM
ECHO RV MID DIMENSION: 2.8 CM
ECHO RVOT MEAN GRADIENT: 1 MMHG
ECHO RVOT PEAK GRADIENT: 1 MMHG
ECHO RVOT PEAK VELOCITY: 0.5 M/S
ECHO RVOT VTI: 10.2 CM
ECHO TV REGURGITANT MAX VELOCITY: 1.38 M/S
ECHO TV REGURGITANT PEAK GRADIENT: 8 MMHG
EKG ATRIAL RATE: 300 BPM
EKG ATRIAL RATE: 300 BPM
EKG ATRIAL RATE: 88 BPM
EKG P AXIS: 68 DEGREES
EKG P AXIS: 72 DEGREES
EKG P AXIS: 96 DEGREES
EKG P-R INTERVAL: 170 MS
EKG Q-T INTERVAL: 358 MS
EKG Q-T INTERVAL: 370 MS
EKG Q-T INTERVAL: 378 MS
EKG QRS DURATION: 84 MS
EKG QRS DURATION: 86 MS
EKG QRS DURATION: 94 MS
EKG QTC CALCULATION (BAZETT): 402 MS
EKG QTC CALCULATION (BAZETT): 413 MS
EKG QTC CALCULATION (BAZETT): 433 MS
EKG R AXIS: -4 DEGREES
EKG R AXIS: 127 DEGREES
EKG R AXIS: 4 DEGREES
EKG T AXIS: -175 DEGREES
EKG T AXIS: 25 DEGREES
EKG T AXIS: 29 DEGREES
EKG VENTRICULAR RATE: 71 BPM
EKG VENTRICULAR RATE: 72 BPM
EKG VENTRICULAR RATE: 88 BPM
EOSINOPHIL # BLD: 0.21 K/UL (ref 0.05–0.5)
EOSINOPHILS RELATIVE PERCENT: 3 % (ref 0–6)
ERYTHROCYTE [DISTWIDTH] IN BLOOD BY AUTOMATED COUNT: 17.1 % (ref 11.5–15)
GFR, ESTIMATED: 58 ML/MIN/1.73M2
GLUCOSE SERPL-MCNC: 101 MG/DL (ref 74–99)
HCT VFR BLD AUTO: 50.1 % (ref 37–54)
HDLC SERPL-MCNC: 35 MG/DL
HGB BLD-MCNC: 14.9 G/DL (ref 12.5–16.5)
IMM GRANULOCYTES # BLD AUTO: <0.03 K/UL (ref 0–0.58)
IMM GRANULOCYTES NFR BLD: 0 % (ref 0–5)
LDLC SERPL CALC-MCNC: 130 MG/DL
LYMPHOCYTES NFR BLD: 1.88 K/UL (ref 1.5–4)
LYMPHOCYTES RELATIVE PERCENT: 24 % (ref 20–42)
MAGNESIUM SERPL-MCNC: 2.2 MG/DL (ref 1.6–2.6)
MCH RBC QN AUTO: 23.7 PG (ref 26–35)
MCHC RBC AUTO-ENTMCNC: 29.7 G/DL (ref 32–34.5)
MCV RBC AUTO: 79.7 FL (ref 80–99.9)
MONOCYTES NFR BLD: 0.75 K/UL (ref 0.1–0.95)
MONOCYTES NFR BLD: 10 % (ref 2–12)
NEUTROPHILS NFR BLD: 62 % (ref 43–80)
NEUTS SEG NFR BLD: 4.9 K/UL (ref 1.8–7.3)
PHOSPHATE SERPL-MCNC: 2.9 MG/DL (ref 2.5–4.5)
PLATELET # BLD AUTO: 350 K/UL (ref 130–450)
PMV BLD AUTO: 9.9 FL (ref 7–12)
POTASSIUM SERPL-SCNC: 4.6 MMOL/L (ref 3.5–5)
PROT SERPL-MCNC: 7.7 G/DL (ref 6.4–8.3)
RBC # BLD AUTO: 6.29 M/UL (ref 3.8–5.8)
SODIUM SERPL-SCNC: 137 MMOL/L (ref 132–146)
TRIGL SERPL-MCNC: 129 MG/DL
TROPONIN I SERPL HS-MCNC: 18 NG/L (ref 0–11)
TSH SERPL DL<=0.05 MIU/L-ACNC: 1.84 UIU/ML (ref 0.27–4.2)
VLDLC SERPL CALC-MCNC: 26 MG/DL
WBC OTHER # BLD: 7.9 K/UL (ref 4.5–11.5)

## 2024-10-31 PROCEDURE — 6370000000 HC RX 637 (ALT 250 FOR IP): Performed by: INTERNAL MEDICINE

## 2024-10-31 PROCEDURE — 83735 ASSAY OF MAGNESIUM: CPT

## 2024-10-31 PROCEDURE — 36415 COLL VENOUS BLD VENIPUNCTURE: CPT

## 2024-10-31 PROCEDURE — 92960 CARDIOVERSION ELECTRIC EXT: CPT

## 2024-10-31 PROCEDURE — 85025 COMPLETE CBC W/AUTO DIFF WBC: CPT

## 2024-10-31 PROCEDURE — 93312 ECHO TRANSESOPHAGEAL: CPT

## 2024-10-31 PROCEDURE — 80061 LIPID PANEL: CPT

## 2024-10-31 PROCEDURE — 7100000000 HC PACU RECOVERY - FIRST 15 MIN

## 2024-10-31 PROCEDURE — 84443 ASSAY THYROID STIM HORMONE: CPT

## 2024-10-31 PROCEDURE — 80053 COMPREHEN METABOLIC PANEL: CPT

## 2024-10-31 PROCEDURE — 93010 ELECTROCARDIOGRAM REPORT: CPT | Performed by: INTERNAL MEDICINE

## 2024-10-31 PROCEDURE — 92960 CARDIOVERSION ELECTRIC EXT: CPT | Performed by: INTERNAL MEDICINE

## 2024-10-31 PROCEDURE — 93005 ELECTROCARDIOGRAM TRACING: CPT

## 2024-10-31 PROCEDURE — 93005 ELECTROCARDIOGRAM TRACING: CPT | Performed by: INTERNAL MEDICINE

## 2024-10-31 PROCEDURE — 99223 1ST HOSP IP/OBS HIGH 75: CPT | Performed by: INTERNAL MEDICINE

## 2024-10-31 PROCEDURE — B24BZZ4 ULTRASONOGRAPHY OF HEART WITH AORTA, TRANSESOPHAGEAL: ICD-10-PCS | Performed by: INTERNAL MEDICINE

## 2024-10-31 PROCEDURE — 84100 ASSAY OF PHOSPHORUS: CPT

## 2024-10-31 PROCEDURE — 84484 ASSAY OF TROPONIN QUANT: CPT

## 2024-10-31 PROCEDURE — 6360000002 HC RX W HCPCS: Performed by: NURSE ANESTHETIST, CERTIFIED REGISTERED

## 2024-10-31 PROCEDURE — 93325 DOPPLER ECHO COLOR FLOW MAPG: CPT | Performed by: INTERNAL MEDICINE

## 2024-10-31 PROCEDURE — G0378 HOSPITAL OBSERVATION PER HR: HCPCS

## 2024-10-31 PROCEDURE — 2580000003 HC RX 258: Performed by: NURSE ANESTHETIST, CERTIFIED REGISTERED

## 2024-10-31 PROCEDURE — 5A2204Z RESTORATION OF CARDIAC RHYTHM, SINGLE: ICD-10-PCS | Performed by: INTERNAL MEDICINE

## 2024-10-31 PROCEDURE — 93312 ECHO TRANSESOPHAGEAL: CPT | Performed by: INTERNAL MEDICINE

## 2024-10-31 PROCEDURE — 7100000001 HC PACU RECOVERY - ADDTL 15 MIN

## 2024-10-31 PROCEDURE — 83880 ASSAY OF NATRIURETIC PEPTIDE: CPT

## 2024-10-31 PROCEDURE — 93320 DOPPLER ECHO COMPLETE: CPT | Performed by: INTERNAL MEDICINE

## 2024-10-31 PROCEDURE — 3700000000 HC ANESTHESIA ATTENDED CARE

## 2024-10-31 PROCEDURE — 93306 TTE W/DOPPLER COMPLETE: CPT

## 2024-10-31 PROCEDURE — 2500000003 HC RX 250 WO HCPCS: Performed by: NURSE ANESTHETIST, CERTIFIED REGISTERED

## 2024-10-31 PROCEDURE — 93306 TTE W/DOPPLER COMPLETE: CPT | Performed by: INTERNAL MEDICINE

## 2024-10-31 PROCEDURE — APPSS180 APP SPLIT SHARED TIME > 60 MINUTES: Performed by: NURSE PRACTITIONER

## 2024-10-31 PROCEDURE — 3700000001 HC ADD 15 MINUTES (ANESTHESIA)

## 2024-10-31 RX ORDER — METOPROLOL TARTRATE 25 MG/1
25 TABLET, FILM COATED ORAL 2 TIMES DAILY
Qty: 60 TABLET | Refills: 1 | Status: SHIPPED | OUTPATIENT
Start: 2024-10-31 | End: 2024-11-18 | Stop reason: SDUPTHER

## 2024-10-31 RX ORDER — PROPOFOL 10 MG/ML
INJECTION, EMULSION INTRAVENOUS
Status: DISCONTINUED | OUTPATIENT
Start: 2024-10-31 | End: 2024-10-31 | Stop reason: SDUPTHER

## 2024-10-31 RX ORDER — LIDOCAINE HYDROCHLORIDE 20 MG/ML
INJECTION, SOLUTION EPIDURAL; INFILTRATION; INTRACAUDAL; PERINEURAL
Status: DISCONTINUED | OUTPATIENT
Start: 2024-10-31 | End: 2024-10-31 | Stop reason: SDUPTHER

## 2024-10-31 RX ORDER — FUROSEMIDE 20 MG/1
20 TABLET ORAL DAILY PRN
Qty: 30 TABLET | Refills: 3 | Status: SHIPPED | OUTPATIENT
Start: 2024-10-31 | End: 2024-11-18 | Stop reason: ALTCHOICE

## 2024-10-31 RX ORDER — SODIUM CHLORIDE 9 MG/ML
INJECTION, SOLUTION INTRAVENOUS
Status: DISCONTINUED | OUTPATIENT
Start: 2024-10-31 | End: 2024-10-31 | Stop reason: SDUPTHER

## 2024-10-31 RX ADMIN — APIXABAN 5 MG: 5 TABLET, FILM COATED ORAL at 12:17

## 2024-10-31 RX ADMIN — PROPOFOL 200 MG: 10 INJECTION, EMULSION INTRAVENOUS at 13:19

## 2024-10-31 RX ADMIN — LIDOCAINE HYDROCHLORIDE 120 MG: 20 INJECTION, SOLUTION EPIDURAL; INFILTRATION; INTRACAUDAL; PERINEURAL at 13:19

## 2024-10-31 RX ADMIN — SODIUM CHLORIDE: 9 INJECTION, SOLUTION INTRAVENOUS at 13:03
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
## 2024-10-31 NOTE — DISCHARGE INSTRUCTIONS
Your information:  Name: Mani Williamson  : 1955    Your instructions:    IF YOU EXPERIENCE ANY OF THE FOLLOWING SYMPTOMS, CHEST PAIN, SHORTNESS OF BREATH, COUGHING UP BLOOD OR BLOODY SPUTUM, STOMACH PAIN OR CRAMPING, DARK, TARRY STOOLS, LOSS OF APPETITE, GENERAL NOT FEELING WELL, SIGNS AND SYMPTOMS OF INFECTION LIKE FEVER AND OR CHILLS, PLEASE CALL YOUR FAMILY DOCTOR OR RETURN TO THE EMERGENCY ROOM.     What to do after you leave the hospital:    Recommended diet: regular diet    Recommended activity: activity as tolerated        The following personal items were collected during your admission and were returned to you:    Belongings  Dental Appliances: None  Vision - Corrective Lenses: None  Hearing Aid: None  Clothing: Belt, Socks, Sweater, Undergarments, Pants  Jewelry: None  Electronic Devices: Cell Phone  Weapons (Notify Protective Services/Security): None  Home Medications: None  Valuables Given To: Patient  Provide Name(s) of Who Valuable(s) Were Given To: Jose Williamson    Information obtained by:  By signing below, I understand that if any problems occur once I leave the hospital I am to contact my family doctor.  I understand and acknowledge receipt of the instructions indicated above.
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
## 2024-10-31 NOTE — PROGRESS NOTES
10/31/24 1136   Encounter Summary   Encounter Overview/Reason Initial Encounter;Spiritual/Emotional Needs   Service Provided For Patient   Referral/Consult From Rounding   Support System Unknown   Last Encounter  10/31/24  (CW)   Complexity of Encounter Moderate   Spiritual/Emotional needs   Type Spiritual Support   Assessment/Intervention/Outcome   Assessment Calm   Intervention Active listening;Discussed belief system/Confucianist practices/evangelina;Discussed illness injury and it’s impact;Discussed relationship with God;Explored/Affirmed feelings, thoughts, concerns;Prayer (assurance of)/[place];Read/Provided Scripture   Outcome Comfort;Acceptance;Engaged in conversation;Expressed Gratitude;Peace;Receptive      visited patient. Patient was in bed. Patient appeared to be calm.  provided a listening presence and offered prayer. Patient was grateful for visit. Spiritual care is on going as needed.       Oswaldo Carrillo  [phone]

## 2024-10-31 NOTE — CARE COORDINATION
Advance Care Planning   Healthcare Decision Maker:    Primary Decision Maker: Corrine Duran - [phone]    Patient refused to provide blood relative as legal contact and provided girlfriend Corrine Duran as .

## 2024-10-31 NOTE — ANESTHESIA POSTPROCEDURE EVALUATION
Department of Anesthesiology  Postprocedure Note    Patient: Mani Williamson  MRN: [id number]  YOB: 1955  Date of evaluation: 10/31/2024    Procedure Summary       Date: 10/31/24 Room / Location: [facility]    Anesthesia Start: 1303 Anesthesia Stop: 1335    Procedure: GAYLE W/ POSSIBLE CARDIOVERSION (PRN CONTRAST/BUBBLE/3D) Diagnosis: Paroxysmal atrial fibrillation (HCC)    Scheduled Providers:  Responsible Provider: Mani Saenz MD    Anesthesia Type: MAC ASA Status: 3            Anesthesia Type: No value filed.    Kevin Phase I: Kevin Score: 8    Kevin Phase II:      Anesthesia Post Evaluation    Patient location during evaluation: PACU  Patient participation: complete - patient participated  Level of consciousness: awake  Pain score: 2  Airway patency: patent  Nausea & Vomiting: no nausea  Cardiovascular status: blood pressure returned to baseline  Respiratory status: acceptable  Hydration status: euvolemic  Pain management: adequate    No notable events documented.

## 2024-10-31 NOTE — CARE COORDINATION
Case Management Assessment  Initial Evaluation    Date/Time of Evaluation: 10/31/2024 9:57 AM  Assessment Completed by: Ángel Corrales    If patient is discharged prior to next notation, then this note serves as note for discharge by case management.    Patient Name: Mani Williamson                   YOB: 1955  Diagnosis: New onset atrial flutter (HCC) [I48.92]  Systolic congestive heart failure, unspecified HF chronicity (HCC) [I50.20]                   Date / Time: 10/30/2024 12:29 PM    Patient Admission Status: Inpatient   Readmission Risk (Low < 19, Mod (19-27), High > 27): Readmission Risk Score: 11.8    Current PCP: Levi Barboza, DO  PCP verified by CM?      Chart Reviewed: Yes      History Provided by:    Patient Orientation:      Patient Cognition:      Hospitalization in the last 30 days (Readmission):  No    If yes, Readmission Assessment in CM Navigator will be completed.    Advance Directives:      Code Status: Full Code   Patient's Primary Decision Maker is:      Primary Decision Maker: Corrine Duran - [phone]    Discharge Planning:    Patient lives with: Spouse/Significant Other Type of Home: House  Primary Care Giver:    Patient Support Systems include: Spouse/Significant Other   Current Financial resources:    Current community resources:    Current services prior to admission: None            Current DME:              Type of Home Care services:  None    ADLS  Prior functional level:    Current functional level:      PT AM-PAC:   /24  OT AM-PAC:   /24    Family can provide assistance at DC:    Would you like Case Management to discuss the discharge plan with any other family members/significant others, and if so, who?    Plans to Return to Present Housing:      Potential Assistance needed at discharge: N/A            Potential DME:    Patient expects to discharge to: House  Plan for transportation at discharge:      Financial    Payor: [employer] / Plan: DEVOTED  HE[employer] PLANS / Product Type: *No Product type* /         Potential assistance Purchasing Medications:    Meds-to-Beds request: Yes        Notes:    Factors facilitating achievement of predicted outcomes: Friend support and Pleasant      Additional Case Management Notes: 10/31/2024 1000 CM Note: Patient presents with A Flutter and was symptomatic at home with SOB with 2 flights of stairs and fatigued. On room air. Active 69 y.o. who works out regularly and works part time. PTA lives alone, independent and drives. One story home 2 entry steps. Girlfriend Corrine Duran at bedside and per patient is his . PCP Dr. CAREY Barboza. Established with [facility]ology at [facility]. He refuses anticoagulant without second opinion from [facility] who does not want to consider associated risks. Pharmacy: [facility]. No prior HHC or OP therapy. D/c plan to return home with support from girlfriend. Ángel Corrales RN [facility]    The Plan for Transition of Care is related to the following treatment goals of New onset atrial flutter (HCC) [I48.92]  Systolic congestive heart failure, unspecified HF chronicity (HCC) [I50.20]    IF APPLICABLE: The Patient and/or patient representative Mani and his family were provided with a choice of provider and agrees with the discharge plan. Freedom of choice list with basic dialogue that supports the patient's individualized plan of care/goals and shares the quality data associated with the providers was provided to:     Patient Representative Name:       The Patient and/or Patient Representative Agree with the Discharge Plan?  yes    Ángel Corrales  Case Management Department

## 2024-10-31 NOTE — PROGRESS NOTES
Patient brought to PACU for scheduled RENAN/Cardioversion with Dr. Joseph. Patient placed on monitors and oxygen; consents verified.  1245: Pre procedure vitals- /89, HR 85, 99% on 3L NC, RR 19  1315: Time out performed  Renan performed  1331: Patient shocked at 200Js  Patient converted to NSR, EKG obtained, friend updated.

## 2024-10-31 NOTE — ANESTHESIA PRE PROCEDURE
Department of Anesthesiology  Preprocedure Note       Name:  Mani Williamson   Age:  69 y.o.  :  1955                                          MRN:  [id number]         Date:  10/31/2024      Surgeon: * Surgery not found *    Procedure:     Medications prior to admission:   Prior to Admission medications    Medication Sig Start Date End Date Taking? Authorizing Provider   Multiple Vitamins-Minerals (MULTI COMPLETE PO) Take by mouth   Yes Provider, Tito, MD       Current medications:    Current Facility-Administered Medications   Medication Dose Route Frequency Provider Last Rate Last Admin    acetaminophen (TYLENOL) tablet 500 mg  500 mg Oral Q6H PRN Ugalde, North A, DO        enoxaparin (LOVENOX) injection 40 mg  40 mg SubCUTAneous Daily Ugalde, North A, DO        prochlorperazine (COMPAZINE) injection 10 mg  10 mg IntraVENous Q6H PRN Ugalde, North A, DO        polyethylene glycol (GLYCOLAX) packet 17 g  17 g Oral Daily PRN Ugalde, North A, DO        [Held by provider] furosemide (LASIX) injection 40 mg  40 mg IntraVENous BID Ugalde, North A, DO   40 mg at 10/30/24 2129    potassium chloride (KLOR-CON M) extended release tablet 20 mEq  20 mEq Oral BID WC Ugalde, North A, DO   20 mEq at 10/30/24 2129    melatonin disintegrating tablet 5 mg  5 mg Oral Nightly Ugalde, North A, DO   5 mg at 10/30/24 2130    sodium phosphate 15 mmol in sodium chloride 0.9 % 250 mL IVPB  15 mmol IntraVENous PRN Kaylee Aponte, APRN - CNP        magnesium sulfate 2000 mg in 50 mL IVPB premix  2,000 mg IntraVENous PRN Kaylee Aponte, APRN - CNP        potassium chloride (KLOR-CON M) extended release tablet 40 mEq  40 mEq Oral PRN Fay Kaylee L, APRN - CNP        Or    potassium bicarb-citric acid (EFFER-K) effervescent tablet 40 mEq  40 mEq Oral PRN Fay Kaylee L, APRN - CNP        Or    potassium chloride 10 mEq/100 mL IVPB (Peripheral Line)  10 mEq IntraVENous PRN Fay Kaylee L, APRN - CNP

## 2024-11-03 ENCOUNTER — HOSPITAL ENCOUNTER (EMERGENCY)
Age: 69
Discharge: HOME | End: 2024-11-03
Payer: COMMERCIAL

## 2024-11-03 VITALS
DIASTOLIC BLOOD PRESSURE: 73 MMHG | HEART RATE: 82 BPM | TEMPERATURE: 98.4 F | OXYGEN SATURATION: 99 % | RESPIRATION RATE: 18 BRPM | SYSTOLIC BLOOD PRESSURE: 130 MMHG

## 2024-11-03 DIAGNOSIS — T30.0 FIRST DEGREE BURNS OF MULTIPLE SITES: Primary | ICD-10-CM

## 2024-11-03 DIAGNOSIS — T24.212A SECOND DEGREE BURN OF LEFT THIGH, INITIAL ENCOUNTER: ICD-10-CM

## 2024-11-03 PROCEDURE — 6370000000 HC RX 637 (ALT 250 FOR IP): Performed by: PHYSICIAN ASSISTANT

## 2024-11-03 PROCEDURE — 99283 EMERGENCY DEPT VISIT LOW MDM: CPT

## 2024-11-03 PROCEDURE — 16020 DRESS/DEBRID P-THICK BURN S: CPT

## 2024-11-03 RX ORDER — OXYCODONE AND ACETAMINOPHEN 5; 325 MG/1; MG/1
1 TABLET ORAL ONCE
Status: COMPLETED | OUTPATIENT
Start: 2024-11-03 | End: 2024-11-03

## 2024-11-03 RX ORDER — OXYCODONE AND ACETAMINOPHEN 5; 325 MG/1; MG/1
1 TABLET ORAL EVERY 6 HOURS PRN
Qty: 12 TABLET | Refills: 0 | Status: SHIPPED | OUTPATIENT
Start: 2024-11-03 | End: 2024-11-06

## 2024-11-03 RX ADMIN — OXYCODONE AND ACETAMINOPHEN 1 TABLET: 5; 325 TABLET ORAL at 16:18
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
## 2024-11-04 NOTE — PROGRESS NOTES
CLINICAL PHARMACY NOTE: MEDS TO BEDS    Total # of Prescriptions Filled: 2   The following medications were delivered to the patient:  Eliquis 5 mg  Furosemide 20 mg    Additional Documentation:    Patient refused the Lopressor and the medication was removed from the bag before discharge.

## 2024-11-14 ENCOUNTER — TELEPHONE (OUTPATIENT)
Dept: CARDIOLOGY CLINIC | Age: 69
End: 2024-11-14

## 2024-11-14 NOTE — TELEPHONE ENCOUNTER
----- Message from Dr. Fito Garcia MD sent at 11/14/2024 12:11 PM EST -----  Can you please add this patient as a hospital follow-up next week in an 1130 spot.  Requesting to transfer to me, okay to schedule.

## 2024-11-18 ENCOUNTER — TELEPHONE (OUTPATIENT)
Dept: CARDIOLOGY CLINIC | Age: 69
End: 2024-11-18

## 2024-11-18 ENCOUNTER — OFFICE VISIT (OUTPATIENT)
Dept: CARDIOLOGY CLINIC | Age: 69
End: 2024-11-18
Payer: COMMERCIAL

## 2024-11-18 VITALS
HEIGHT: 67 IN | DIASTOLIC BLOOD PRESSURE: 82 MMHG | BODY MASS INDEX: 27.31 KG/M2 | WEIGHT: 174 LBS | RESPIRATION RATE: 18 BRPM | SYSTOLIC BLOOD PRESSURE: 142 MMHG | OXYGEN SATURATION: 97 % | HEART RATE: 84 BPM | TEMPERATURE: 98.2 F

## 2024-11-18 DIAGNOSIS — I48.19 PERSISTENT ATRIAL FIBRILLATION (HCC): Primary | ICD-10-CM

## 2024-11-18 DIAGNOSIS — R03.0 ELEVATED BLOOD PRESSURE READING IN OFFICE WITHOUT DIAGNOSIS OF HYPERTENSION: ICD-10-CM

## 2024-11-18 DIAGNOSIS — I34.0 NONRHEUMATIC MITRAL VALVE REGURGITATION: ICD-10-CM

## 2024-11-18 PROBLEM — I50.9 ACUTE DECOMPENSATED HEART FAILURE (HCC): Status: RESOLVED | Noted: 2024-10-31 | Resolved: 2024-11-18

## 2024-11-18 PROCEDURE — 1123F ACP DISCUSS/DSCN MKR DOCD: CPT | Performed by: INTERNAL MEDICINE

## 2024-11-18 PROCEDURE — 1159F MED LIST DOCD IN RCRD: CPT | Performed by: INTERNAL MEDICINE

## 2024-11-18 PROCEDURE — 93000 ELECTROCARDIOGRAM COMPLETE: CPT | Performed by: INTERNAL MEDICINE

## 2024-11-18 PROCEDURE — 99215 OFFICE O/P EST HI 40 MIN: CPT | Performed by: INTERNAL MEDICINE

## 2024-11-18 RX ORDER — METOPROLOL TARTRATE 25 MG/1
25 TABLET, FILM COATED ORAL 2 TIMES DAILY
Qty: 180 TABLET | OUTPATIENT
Start: 2024-11-18

## 2024-11-18 RX ORDER — METOPROLOL TARTRATE 25 MG/1
25 TABLET, FILM COATED ORAL 2 TIMES DAILY
Qty: 60 TABLET | Refills: 3 | Status: SHIPPED | OUTPATIENT
Start: 2024-11-18

## 2024-11-18 NOTE — PATIENT INSTRUCTIONS
Take eliquis twice daily  Take metoprolol twice daily  EKG in 2 weeks  If still in atrial fib, will arrange cardioversion
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
## 2024-11-25 NOTE — DISCHARGE SUMMARY
pneumothorax. Upper Abdomen: Limited images of the upper abdomen reveals low attenuation throughout the liver of hepatic steatosis Soft Tissues/Bones: No acute bone or soft tissue abnormality.     1. No evidence of pulmonary embolism or acute pulmonary abnormality. 2. Cardiomegaly with interstitial edema pattern.  No pleural effusion of decompensation evident 3. Hepatic steatosis. 4. Small hiatal hernia.     Vascular duplex lower extremity venous right    Result Date: 10/30/2024  EXAMINATION: DUPLEX VENOUS ULTRASOUND OF THE RIGHT LOWER EXTREMITY 10/30/2024 1:32 pm TECHNIQUE: Duplex ultrasound using B-mode/gray scaled imaging and Doppler spectral analysis and color flow was obtained of the deep venous structures of the right extremity. COMPARISON: None. HISTORY: ORDERING SYSTEM PROVIDED HISTORY: Edema right calf FINDINGS: The visualized veins of the right lower extremity are patent and free of echogenic thrombus. The veins demonstrate good compressibility with normal color flow study and spectral analysis.  Diffuse edema.  2 puncture wounds identified in the region of the right calf.     No evidence of DVT in the right lower extremity.     Encounter Date: 10/30/24   EKG 12 Lead   Result Value    Ventricular Rate 88    Atrial Rate 88    P-R Interval 170    QRS Duration 84    Q-T Interval 358    QTc Calculation (Bazett) 433    P Axis 72    R Axis -4    T Axis 29    Narrative    Normal sinus rhythm  Inferior infarct , age undetermined  Abnormal ECG  When compared with ECG of 31-OCT-2024 07:43,  Sinus rhythm has replaced Atrial flutter  Inferior infarct is now present  Confirmed by Benedict Joseph ([id number]) on 10/31/2024 3:54:15 PM         ASSESSMENT:  New onset atrial flutter-converted to SR with GAYLE and electrical cardioversion on10/31  Elevated troponin likely due to demand ischemia  Hepatic steatosis  History of seizures S/P brain surgery  Acute CHF with pEF    PLAN:    Discharge home    Prescription for Eliquis 5 mg

## 2024-12-02 ENCOUNTER — TELEPHONE (OUTPATIENT)
Dept: CARDIOLOGY CLINIC | Age: 69
End: 2024-12-02

## 2024-12-02 NOTE — TELEPHONE ENCOUNTER
----- Message from Dr. Fito Garcia MD sent at 12/2/2024  8:38 AM EST -----  As he is now following with [facility] for his AFib does not need the to keep with upcoming EKG appointment nor follow-up visit that was scheduled.  He should follow-up with [facility] for all further cardiac management.

## 2024-12-03 NOTE — TELEPHONE ENCOUNTER
Patient notified to follow with [facility] for all further cardiac management per Dr. Garcia's recommendation. Patient states he wants to follow with both [facility] and Dr. Garcia.

## 2024-12-29 ENCOUNTER — HOSPITAL ENCOUNTER (EMERGENCY)
Age: 69
Discharge: HOME | End: 2024-12-29
Payer: COMMERCIAL

## 2024-12-29 VITALS
BODY MASS INDEX: 26.78 KG/M2 | TEMPERATURE: 98.6 F | DIASTOLIC BLOOD PRESSURE: 90 MMHG | SYSTOLIC BLOOD PRESSURE: 149 MMHG | HEART RATE: 85 BPM | OXYGEN SATURATION: 97 % | RESPIRATION RATE: 18 BRPM | WEIGHT: 171 LBS

## 2024-12-29 DIAGNOSIS — J01.90 ACUTE SINUSITIS, RECURRENCE NOT SPECIFIED, UNSPECIFIED LOCATION: Primary | ICD-10-CM

## 2024-12-29 LAB
FLUAV RNA RESP QL NAA+PROBE: NOT DETECTED
FLUBV RNA RESP QL NAA+PROBE: NOT DETECTED
SARS-COV-2 RNA RESP QL NAA+PROBE: NOT DETECTED
SOURCE: NORMAL
SPECIMEN DESCRIPTION: NORMAL

## 2024-12-29 PROCEDURE — 99211 OFF/OP EST MAY X REQ PHY/QHP: CPT

## 2024-12-29 PROCEDURE — 87636 SARSCOV2 & INF A&B AMP PRB: CPT

## 2024-12-29 ASSESSMENT — PAIN - FUNCTIONAL ASSESSMENT: PAIN_FUNCTIONAL_ASSESSMENT: NONE - DENIES PAIN

## 2024-12-29 NOTE — ED PROVIDER NOTES
acts as his own historian.    Social Determinants include   Social Connections: Not on file    Social Determinants : None.   Chronic conditions    Past Medical History:   Diagnosis Date    Seizures (HCC)     had surgery for them, last seizure 2008   .    Physical exam-  Constitutional:  Alert, development consistent with age.  Ears:  External Ears: Bilateral normal.               TM's & External Canals: abnormal TM right ear - serous middle ear fluid, abnormal TM left ear - serous middle ear fluid.  Nose:   There is clear rhinorrhea.  Sinuses: mild Bilateral maxillary sinus tenderness.                    no Bilateral frontal sinus tenderness.  Mouth:  normal tongue and buccal mucosa.   Throat: no exudates noted. Teeth and gums normal., mild erythema.  Airway Patent.  Neck:  Supple. No meningeal signs. There is no  anterior cervical and posterior cervical node tenderness.  Respiratory:   Breath sounds: Bilateral normal.  Lung sounds: normal.   CV:  Regular rate and rhythm, normal heart sounds, without pathological murmurs, ectopy, gallops, or rubs.  Vitals:    12/29/24 1418   BP: (!) 149/90   Pulse: 85   Resp: 18   Temp: 98.6 °F (37 °C)   SpO2: 97%   Weight: 77.6 kg (171 lb)      Differential diagnoses include but not limited to COVID, influenza, sinusitis, sepsis, viral pharyngitis, pneumonia, bronchitis, etc.     Diagnostic studies revealed rapid COVID and influenza testing resulted as negative.  Patient most likely has a sinusitis appropriate for antibiotic initiation.  Patient was sent prescriptions to pharmacy of choice to be taken as directed.  He is to report to his PCP in 1 week for outpatient evaluation and to ER with new or worsening symptoms.  Patient is understandable and agreeable to plan.  Discharge Instructions:   Patient referred to  Levi Barboza DO  [address] [place] [address]  [phone]    Schedule an appointment as soon as possible for a visit in 1 week        MEDICATIONS:

## 2025-02-27 ENCOUNTER — APPOINTMENT (OUTPATIENT)
Dept: GENERAL RADIOLOGY | Age: 70
End: 2025-02-27
Payer: MEDICARE

## 2025-02-27 ENCOUNTER — HOSPITAL ENCOUNTER (EMERGENCY)
Age: 70
Discharge: LEFT BEFORE DISCHARGE | End: 2025-02-28
Attending: EMERGENCY MEDICINE
Payer: MEDICARE

## 2025-02-27 VITALS
HEART RATE: 87 BPM | OXYGEN SATURATION: 97 % | RESPIRATION RATE: 20 BRPM | SYSTOLIC BLOOD PRESSURE: 143 MMHG | TEMPERATURE: 97.6 F | DIASTOLIC BLOOD PRESSURE: 68 MMHG

## 2025-02-27 DIAGNOSIS — R07.9 CHEST PAIN, UNSPECIFIED TYPE: Primary | ICD-10-CM

## 2025-02-27 DIAGNOSIS — R79.89 ELEVATED TROPONIN: ICD-10-CM

## 2025-02-27 DIAGNOSIS — Z53.29 LEFT AGAINST MEDICAL ADVICE: ICD-10-CM

## 2025-02-27 LAB
ALBUMIN SERPL-MCNC: 4 G/DL (ref 3.5–5.2)
ALP SERPL-CCNC: 85 U/L (ref 40–129)
ALT SERPL-CCNC: 31 U/L (ref 0–40)
ANION GAP SERPL CALCULATED.3IONS-SCNC: 11 MMOL/L (ref 7–16)
AST SERPL-CCNC: 34 U/L (ref 0–39)
BASOPHILS # BLD: 0 K/UL (ref 0–0.2)
BASOPHILS NFR BLD: 0 % (ref 0–2)
BILIRUB SERPL-MCNC: 0.9 MG/DL (ref 0–1.2)
BUN SERPL-MCNC: 26 MG/DL (ref 6–23)
CALCIUM SERPL-MCNC: 9 MG/DL (ref 8.6–10.2)
CHLORIDE SERPL-SCNC: 104 MMOL/L (ref 98–107)
CO2 SERPL-SCNC: 23 MMOL/L (ref 22–29)
CREAT SERPL-MCNC: 1.2 MG/DL (ref 0.7–1.2)
EOSINOPHIL # BLD: 0 K/UL (ref 0.05–0.5)
EOSINOPHILS RELATIVE PERCENT: 0 % (ref 0–6)
ERYTHROCYTE [DISTWIDTH] IN BLOOD BY AUTOMATED COUNT: 19.9 % (ref 11.5–15)
FLUAV RNA RESP QL NAA+PROBE: NOT DETECTED
FLUBV RNA RESP QL NAA+PROBE: NOT DETECTED
GFR, ESTIMATED: 67 ML/MIN/1.73M2
GLUCOSE SERPL-MCNC: 105 MG/DL (ref 74–99)
HCT VFR BLD AUTO: 37.1 % (ref 37–54)
HGB BLD-MCNC: 11.4 G/DL (ref 12.5–16.5)
LYMPHOCYTES NFR BLD: 2.74 K/UL (ref 1.5–4)
LYMPHOCYTES RELATIVE PERCENT: 16 % (ref 20–42)
MCH RBC QN AUTO: 22.6 PG (ref 26–35)
MCHC RBC AUTO-ENTMCNC: 30.7 G/DL (ref 32–34.5)
MCV RBC AUTO: 73.5 FL (ref 80–99.9)
MONOCYTES NFR BLD: 0.91 K/UL (ref 0.1–0.95)
MONOCYTES NFR BLD: 5 % (ref 2–12)
NEUTROPHILS NFR BLD: 79 % (ref 43–80)
NEUTS SEG NFR BLD: 13.85 K/UL (ref 1.8–7.3)
PLATELET # BLD AUTO: 292 K/UL (ref 130–450)
PMV BLD AUTO: 10.2 FL (ref 7–12)
POTASSIUM SERPL-SCNC: 4.5 MMOL/L (ref 3.5–5)
PROT SERPL-MCNC: 6.8 G/DL (ref 6.4–8.3)
RBC # BLD AUTO: 5.05 M/UL (ref 3.8–5.8)
RBC # BLD: ABNORMAL 10*6/UL
SARS-COV-2 RNA RESP QL NAA+PROBE: NOT DETECTED
SODIUM SERPL-SCNC: 138 MMOL/L (ref 132–146)
SOURCE: NORMAL
SPECIMEN DESCRIPTION: NORMAL
TROPONIN I SERPL HS-MCNC: 767 NG/L (ref 0–11)
WBC OTHER # BLD: 17.5 K/UL (ref 4.5–11.5)

## 2025-02-27 PROCEDURE — 85025 COMPLETE CBC W/AUTO DIFF WBC: CPT

## 2025-02-27 PROCEDURE — 93005 ELECTROCARDIOGRAM TRACING: CPT | Performed by: EMERGENCY MEDICINE

## 2025-02-27 PROCEDURE — 99285 EMERGENCY DEPT VISIT HI MDM: CPT

## 2025-02-27 PROCEDURE — 80053 COMPREHEN METABOLIC PANEL: CPT

## 2025-02-27 PROCEDURE — 71046 X-RAY EXAM CHEST 2 VIEWS: CPT

## 2025-02-27 PROCEDURE — 84484 ASSAY OF TROPONIN QUANT: CPT

## 2025-02-27 PROCEDURE — 87636 SARSCOV2 & INF A&B AMP PRB: CPT

## 2025-02-27 ASSESSMENT — ENCOUNTER SYMPTOMS
SHORTNESS OF BREATH: 0
DIARRHEA: 0
SINUS PRESSURE: 0
VOMITING: 0
NAUSEA: 0
COUGH: 0
EYE REDNESS: 0
SORE THROAT: 1
EYE DISCHARGE: 0
ABDOMINAL PAIN: 0
WHEEZING: 0
EYE PAIN: 0
BACK PAIN: 0

## 2025-02-28 LAB — TROPONIN I SERPL HS-MCNC: 720 NG/L (ref 0–11)

## 2025-02-28 NOTE — ED NOTES
Patient removed cardiac leads because he stated \"they aren't working anyway\" and requested to have his IV taken out to leave. Patient educated on risks of leaving against medical advice. Provider notified and spoke to patient. Removed patient IV. Patient alert and oriented at this time, wife at chairside.

## 2025-02-28 NOTE — ED PROVIDER NOTES
WBC 17.5 (H) 4.5 - 11.5 k/uL    RBC 5.05 3.80 - 5.80 m/uL    Hemoglobin 11.4 (L) 12.5 - 16.5 g/dL    Hematocrit 37.1 37.0 - 54.0 %    MCV 73.5 (L) 80.0 - 99.9 fL    MCH 22.6 (L) 26.0 - 35.0 pg    MCHC 30.7 (L) 32.0 - 34.5 g/dL    RDW 19.9 (H) 11.5 - 15.0 %    Platelets 292 130 - 450 k/uL    MPV 10.2 7.0 - 12.0 fL    Neutrophils % 79 43.0 - 80.0 %    Lymphocytes % 16 (L) 20.0 - 42.0 %    Monocytes % 5 2.0 - 12.0 %    Eosinophils % 0 0 - 6 %    Basophils % 0 0.0 - 2.0 %    Neutrophils Absolute 13.85 (H) 1.80 - 7.30 k/uL    Lymphocytes Absolute 2.74 1.50 - 4.00 k/uL    Monocytes Absolute 0.91 0.10 - 0.95 k/uL    Eosinophils Absolute 0.00 (L) 0.05 - 0.50 k/uL    Basophils Absolute 0.00 0.00 - 0.20 k/uL    RBC Morphology 2+ ANISOCYTOSIS     RBC Morphology 1+ OVALOCYTES     RBC Morphology 2+ POIKILOCYTOSIS     RBC Morphology 1+ POLYCHROMASIA     RBC Morphology 1+ STOMATOCYTES     RBC Morphology 1+ TARGET CELLS    Comprehensive Metabolic Panel   Result Value Ref Range    Sodium 138 132 - 146 mmol/L    Potassium 4.5 3.5 - 5.0 mmol/L    Chloride 104 98 - 107 mmol/L    CO2 23 22 - 29 mmol/L    Anion Gap 11 7 - 16 mmol/L    Glucose 105 (H) 74 - 99 mg/dL    BUN 26 (H) 6 - 23 mg/dL    Creatinine 1.2 0.70 - 1.20 mg/dL    Est, Glom Filt Rate 67 >60 mL/min/1.73m2    Calcium 9.0 8.6 - 10.2 mg/dL    Total Protein 6.8 6.4 - 8.3 g/dL    Albumin 4.0 3.5 - 5.2 g/dL    Total Bilirubin 0.9 0.0 - 1.2 mg/dL    Alkaline Phosphatase 85 40 - 129 U/L    ALT 31 0 - 40 U/L    AST 34 0 - 39 U/L   Troponin   Result Value Ref Range    Troponin, High Sensitivity 767 (H) 0 - 11 ng/L   Troponin   Result Value Ref Range    Troponin, High Sensitivity 720 (H) 0 - 11 ng/L       Radiology:  XR CHEST (2 VW)   Final Result   No acute cardiopulmonary process.             ------------------------- NURSING NOTES AND VITALS REVIEWED ---------------------------  Date / Time Roomed:  2/27/2025 10:04 PM  ED Bed Assignment:  [id number]    The nursing notes within  the ED encounter and vital signs as below have been reviewed.   BP (!) 143/68   Pulse 87   Temp 97.6 °F (36.4 °C) (Infrared)   Resp 20   SpO2 97%   Oxygen Saturation Interpretation: Normal    This patient has chosen to leave against medical advice.  I have personally explained to them that choosing to do so may result in permanent bodily harm or death.  I discussed at length that without further evaluation and monitoring there may be unforeseen circumstances and deterioration resulting in permanent bodily harm or death as a result of their choice.    They are alert, oriented, and competent at this time.  They state that they are aware of the serious risks as explained, but they continue to wish to leave against medical advice.    In light of their decision to leave against medical advice, follow-up has been arranged and they are aware of the importance of following up as instructed.  They have been advised that they should return to the ED immediately if they change their mind at any time, or if their condition begins to change or worsen.            Harshad Liu,   02/28/25 0106

## 2025-03-02 LAB
EKG ATRIAL RATE: 82 BPM
EKG P AXIS: 63 DEGREES
EKG P-R INTERVAL: 174 MS
EKG Q-T INTERVAL: 340 MS
EKG QRS DURATION: 80 MS
EKG QTC CALCULATION (BAZETT): 397 MS
EKG R AXIS: 53 DEGREES
EKG T AXIS: -7 DEGREES
EKG VENTRICULAR RATE: 82 BPM

## 2025-03-02 PROCEDURE — 93010 ELECTROCARDIOGRAM REPORT: CPT | Performed by: INTERNAL MEDICINE

## 2025-05-14 ENCOUNTER — APPOINTMENT (OUTPATIENT)
Dept: CT IMAGING | Age: 70
End: 2025-05-14
Payer: MEDICARE

## 2025-05-14 ENCOUNTER — HOSPITAL ENCOUNTER (EMERGENCY)
Age: 70
Discharge: HOME | End: 2025-05-14
Attending: EMERGENCY MEDICINE
Payer: MEDICARE

## 2025-05-14 VITALS
RESPIRATION RATE: 16 BRPM | BODY MASS INDEX: 27.64 KG/M2 | OXYGEN SATURATION: 98 % | SYSTOLIC BLOOD PRESSURE: 153 MMHG | DIASTOLIC BLOOD PRESSURE: 67 MMHG | TEMPERATURE: 98.5 F | HEIGHT: 66 IN | WEIGHT: 172 LBS | HEART RATE: 66 BPM

## 2025-05-14 DIAGNOSIS — N20.1 URETEROLITHIASIS: Primary | ICD-10-CM

## 2025-05-14 DIAGNOSIS — N28.9 RENAL INSUFFICIENCY: ICD-10-CM

## 2025-05-14 LAB
ALBUMIN SERPL-MCNC: 4 G/DL (ref 3.5–5.2)
ALP SERPL-CCNC: 103 U/L (ref 40–129)
ALT SERPL-CCNC: 39 U/L (ref 0–40)
ANION GAP SERPL CALCULATED.3IONS-SCNC: 9 MMOL/L (ref 7–16)
AST SERPL-CCNC: 46 U/L (ref 0–39)
BASOPHILS # BLD: 0.12 K/UL (ref 0–0.2)
BASOPHILS NFR BLD: 2 % (ref 0–2)
BILIRUB SERPL-MCNC: 0.3 MG/DL (ref 0–1.2)
BILIRUB UR QL STRIP: NEGATIVE
BUN SERPL-MCNC: 32 MG/DL (ref 6–23)
CALCIUM SERPL-MCNC: 9 MG/DL (ref 8.6–10.2)
CHLORIDE SERPL-SCNC: 105 MMOL/L (ref 98–107)
CLARITY UR: CLEAR
CO2 SERPL-SCNC: 25 MMOL/L (ref 22–29)
COLOR UR: YELLOW
CREAT SERPL-MCNC: 2.1 MG/DL (ref 0.7–1.2)
EOSINOPHIL # BLD: 0.41 K/UL (ref 0.05–0.5)
EOSINOPHILS RELATIVE PERCENT: 6 % (ref 0–6)
ERYTHROCYTE [DISTWIDTH] IN BLOOD BY AUTOMATED COUNT: 18.6 % (ref 11.5–15)
GFR, ESTIMATED: 34 ML/MIN/1.73M2
GLUCOSE SERPL-MCNC: 103 MG/DL (ref 74–99)
GLUCOSE UR STRIP-MCNC: NEGATIVE MG/DL
HCT VFR BLD AUTO: 40 % (ref 37–54)
HGB BLD-MCNC: 11.9 G/DL (ref 12.5–16.5)
HGB UR QL STRIP.AUTO: NEGATIVE
IMM GRANULOCYTES # BLD AUTO: 0.03 K/UL (ref 0–0.58)
IMM GRANULOCYTES NFR BLD: 0 % (ref 0–5)
KETONES UR STRIP-MCNC: NEGATIVE MG/DL
LEUKOCYTE ESTERASE UR QL STRIP: NEGATIVE
LYMPHOCYTES NFR BLD: 1.79 K/UL (ref 1.5–4)
LYMPHOCYTES RELATIVE PERCENT: 24 % (ref 20–42)
MCH RBC QN AUTO: 23.3 PG (ref 26–35)
MCHC RBC AUTO-ENTMCNC: 29.8 G/DL (ref 32–34.5)
MCV RBC AUTO: 78.4 FL (ref 80–99.9)
MONOCYTES NFR BLD: 0.85 K/UL (ref 0.1–0.95)
MONOCYTES NFR BLD: 11 % (ref 2–12)
NEUTROPHILS NFR BLD: 57 % (ref 43–80)
NEUTS SEG NFR BLD: 4.24 K/UL (ref 1.8–7.3)
NITRITE UR QL STRIP: NEGATIVE
PH UR STRIP: 6 [PH] (ref 5–8)
PLATELET # BLD AUTO: 290 K/UL (ref 130–450)
PMV BLD AUTO: 9.8 FL (ref 7–12)
POTASSIUM SERPL-SCNC: 4.8 MMOL/L (ref 3.5–5)
PROT SERPL-MCNC: 6.7 G/DL (ref 6.4–8.3)
PROT UR STRIP-MCNC: NEGATIVE MG/DL
RBC # BLD AUTO: 5.1 M/UL (ref 3.8–5.8)
RBC #/AREA URNS HPF: NORMAL /HPF
SODIUM SERPL-SCNC: 139 MMOL/L (ref 132–146)
SP GR UR STRIP: 1.02 (ref 1–1.03)
UROBILINOGEN UR STRIP-ACNC: 0.2 EU/DL (ref 0–1)
WBC #/AREA URNS HPF: NORMAL /HPF
WBC OTHER # BLD: 7.4 K/UL (ref 4.5–11.5)

## 2025-05-14 PROCEDURE — 74176 CT ABD & PELVIS W/O CONTRAST: CPT

## 2025-05-14 PROCEDURE — 85025 COMPLETE CBC W/AUTO DIFF WBC: CPT

## 2025-05-14 PROCEDURE — 2580000003 HC RX 258: Performed by: EMERGENCY MEDICINE

## 2025-05-14 PROCEDURE — 99284 EMERGENCY DEPT VISIT MOD MDM: CPT

## 2025-05-14 PROCEDURE — 80053 COMPREHEN METABOLIC PANEL: CPT

## 2025-05-14 PROCEDURE — 81001 URINALYSIS AUTO W/SCOPE: CPT

## 2025-05-14 RX ORDER — 0.9 % SODIUM CHLORIDE 0.9 %
1000 INTRAVENOUS SOLUTION INTRAVENOUS ONCE
Status: COMPLETED | OUTPATIENT
Start: 2025-05-14 | End: 2025-05-14

## 2025-05-14 RX ORDER — KETOROLAC TROMETHAMINE 30 MG/ML
15 INJECTION, SOLUTION INTRAMUSCULAR; INTRAVENOUS ONCE
Status: DISCONTINUED | OUTPATIENT
Start: 2025-05-14 | End: 2025-05-14 | Stop reason: HOSPADM

## 2025-05-14 RX ORDER — ONDANSETRON 4 MG/1
4 TABLET, ORALLY DISINTEGRATING ORAL 3 TIMES DAILY PRN
Qty: 21 TABLET | Refills: 0 | Status: SHIPPED | OUTPATIENT
Start: 2025-05-14

## 2025-05-14 RX ORDER — TAMSULOSIN HYDROCHLORIDE 0.4 MG/1
0.4 CAPSULE ORAL DAILY
Qty: 10 CAPSULE | Refills: 0 | Status: SHIPPED | OUTPATIENT
Start: 2025-05-14

## 2025-05-14 RX ADMIN — SODIUM CHLORIDE 1000 ML: 0.9 INJECTION, SOLUTION INTRAVENOUS at 06:07

## 2025-05-14 ASSESSMENT — PAIN DESCRIPTION - DESCRIPTORS: DESCRIPTORS: SHARP

## 2025-05-14 ASSESSMENT — ENCOUNTER SYMPTOMS
SHORTNESS OF BREATH: 0
EYE DISCHARGE: 0
EYE PAIN: 0
COUGH: 0
SINUS PRESSURE: 0
BACK PAIN: 0
DIARRHEA: 0
EYE REDNESS: 0
SORE THROAT: 0
VOMITING: 0
NAUSEA: 1
WHEEZING: 0
ABDOMINAL PAIN: 0

## 2025-05-14 ASSESSMENT — PAIN DESCRIPTION - PAIN TYPE: TYPE: ACUTE PAIN

## 2025-05-14 ASSESSMENT — PAIN DESCRIPTION - LOCATION: LOCATION: BACK;RIB CAGE

## 2025-05-14 ASSESSMENT — PAIN SCALES - GENERAL: PAINLEVEL_OUTOF10: 8

## 2025-05-14 ASSESSMENT — PAIN DESCRIPTION - ORIENTATION: ORIENTATION: LEFT

## 2025-05-14 ASSESSMENT — PAIN DESCRIPTION - FREQUENCY: FREQUENCY: CONTINUOUS

## 2025-05-14 ASSESSMENT — PAIN - FUNCTIONAL ASSESSMENT: PAIN_FUNCTIONAL_ASSESSMENT: 0-10
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
## (undated) DEVICE — WIPES SKIN CLOTH READYPREP 9 X 10.5 IN 2% CHLORHEX GLUCONATE CHG PREOP

## (undated) DEVICE — GLOVE ORANGE PI 7 1/2   MSG9075

## (undated) DEVICE — PREP TRAY 10X5X2: Brand: MEDLINE INDUSTRIES, INC.

## (undated) DEVICE — SURGICAL PROCEDURE PACK CATRCT LT EYE BASIC CUST ST JOS LF

## (undated) DEVICE — SURGICAL PREP KIT DRY EYE TY STRL

## (undated) DEVICE — ELECTRODE PT RET AD L9FT HI MOIST COND ADH HYDRGEL CORDED

## (undated) DEVICE — SOLUTION SCRB 32OZ 7.5% POVIDONE IOD BTL GENTLE EFFECTIVE

## (undated) DEVICE — BLADE,STAINLESS-STEEL,10,STRL,DISPOSABLE: Brand: MEDLINE

## (undated) DEVICE — BASIC: Brand: MEDLINE INDUSTRIES, INC.

## (undated) DEVICE — ENCORE® LATEX MICRO SIZE 8.5, STERILE LATEX POWDER-FREE SURGICAL GLOVE: Brand: ENCORE

## (undated) DEVICE — 3 ML SYRINGE LUER-LOCK TIP: Brand: MONOJECT

## (undated) DEVICE — BLADE CLIPPER GEN PURP NS

## (undated) DEVICE — SOLUTION IV IRRIG POUR BRL 0.9% SODIUM CHL 2F7124

## (undated) DEVICE — SOLUTION IV IRRIG WATER 1000ML POUR BRL 2F7114

## (undated) DEVICE — Device

## (undated) DEVICE — STRAP POS MP 30X3 IN HK LOOP CLOSURE FOAM DISP

## (undated) DEVICE — STERILE POLYISOPRENE POWDER-FREE SURGICAL GLOVES: Brand: PROTEXIS